# Patient Record
Sex: FEMALE | Race: ASIAN | NOT HISPANIC OR LATINO | Employment: OTHER | URBAN - METROPOLITAN AREA
[De-identification: names, ages, dates, MRNs, and addresses within clinical notes are randomized per-mention and may not be internally consistent; named-entity substitution may affect disease eponyms.]

---

## 2018-04-12 ENCOUNTER — APPOINTMENT (OUTPATIENT)
Dept: GENERAL RADIOLOGY | Facility: CLINIC | Age: 58
End: 2018-04-12
Attending: EMERGENCY MEDICINE
Payer: COMMERCIAL

## 2018-04-12 ENCOUNTER — HOSPITAL ENCOUNTER (OUTPATIENT)
Facility: CLINIC | Age: 58
Setting detail: OBSERVATION
Discharge: HOME | End: 2018-04-13
Attending: EMERGENCY MEDICINE | Admitting: EMERGENCY MEDICINE
Payer: COMMERCIAL

## 2018-04-12 DIAGNOSIS — R07.9 CHEST PAIN, UNSPECIFIED TYPE: ICD-10-CM

## 2018-04-12 DIAGNOSIS — R07.89 CHEST WALL PAIN: ICD-10-CM

## 2018-04-12 LAB
ALBUMIN SERPL-MCNC: 3.5 G/DL (ref 3.4–5)
ALP SERPL-CCNC: 69 U/L (ref 40–150)
ALT SERPL W P-5'-P-CCNC: 16 U/L (ref 0–50)
ANION GAP SERPL CALCULATED.3IONS-SCNC: 7 MMOL/L (ref 3–14)
AST SERPL W P-5'-P-CCNC: 14 U/L (ref 0–45)
BASOPHILS # BLD AUTO: 0 10E9/L (ref 0–0.2)
BASOPHILS NFR BLD AUTO: 0.2 %
BILIRUB SERPL-MCNC: 0.2 MG/DL (ref 0.2–1.3)
BUN SERPL-MCNC: 12 MG/DL (ref 7–30)
CALCIUM SERPL-MCNC: 9.1 MG/DL (ref 8.5–10.1)
CHLORIDE SERPL-SCNC: 108 MMOL/L (ref 94–109)
CO2 SERPL-SCNC: 27 MMOL/L (ref 20–32)
CREAT SERPL-MCNC: 0.7 MG/DL (ref 0.52–1.04)
D DIMER PPP FEU-MCNC: 0.3 UG/ML FEU (ref 0–0.5)
DIFFERENTIAL METHOD BLD: NORMAL
EOSINOPHIL # BLD AUTO: 0.1 10E9/L (ref 0–0.7)
EOSINOPHIL NFR BLD AUTO: 2.4 %
ERYTHROCYTE [DISTWIDTH] IN BLOOD BY AUTOMATED COUNT: 13.5 % (ref 10–15)
GFR SERPL CREATININE-BSD FRML MDRD: 86 ML/MIN/1.7M2
GLUCOSE SERPL-MCNC: 89 MG/DL (ref 70–99)
HCT VFR BLD AUTO: 36.3 % (ref 35–47)
HGB BLD-MCNC: 11.7 G/DL (ref 11.7–15.7)
IMM GRANULOCYTES # BLD: 0 10E9/L (ref 0–0.4)
IMM GRANULOCYTES NFR BLD: 0.4 %
LYMPHOCYTES # BLD AUTO: 2.4 10E9/L (ref 0.8–5.3)
LYMPHOCYTES NFR BLD AUTO: 44.8 %
MCH RBC QN AUTO: 26.7 PG (ref 26.5–33)
MCHC RBC AUTO-ENTMCNC: 32.2 G/DL (ref 31.5–36.5)
MCV RBC AUTO: 83 FL (ref 78–100)
MONOCYTES # BLD AUTO: 0.3 10E9/L (ref 0–1.3)
MONOCYTES NFR BLD AUTO: 5.1 %
NEUTROPHILS # BLD AUTO: 2.5 10E9/L (ref 1.6–8.3)
NEUTROPHILS NFR BLD AUTO: 47.1 %
NRBC # BLD AUTO: 0 10*3/UL
NRBC BLD AUTO-RTO: 0 /100
NT-PROBNP SERPL-MCNC: 220 PG/ML (ref 0–900)
PLATELET # BLD AUTO: 238 10E9/L (ref 150–450)
POTASSIUM SERPL-SCNC: 4.3 MMOL/L (ref 3.4–5.3)
PROT SERPL-MCNC: 7.5 G/DL (ref 6.8–8.8)
RBC # BLD AUTO: 4.38 10E12/L (ref 3.8–5.2)
SODIUM SERPL-SCNC: 142 MMOL/L (ref 133–144)
TROPONIN I SERPL-MCNC: 0.03 UG/L (ref 0–0.04)
TROPONIN I SERPL-MCNC: 0.03 UG/L (ref 0–0.04)
WBC # BLD AUTO: 5.3 10E9/L (ref 4–11)

## 2018-04-12 PROCEDURE — 84484 ASSAY OF TROPONIN QUANT: CPT | Mod: 91 | Performed by: NURSE PRACTITIONER

## 2018-04-12 PROCEDURE — 99285 EMERGENCY DEPT VISIT HI MDM: CPT | Mod: 25 | Performed by: EMERGENCY MEDICINE

## 2018-04-12 PROCEDURE — 84484 ASSAY OF TROPONIN QUANT: CPT | Performed by: EMERGENCY MEDICINE

## 2018-04-12 PROCEDURE — G0378 HOSPITAL OBSERVATION PER HR: HCPCS

## 2018-04-12 PROCEDURE — 85025 COMPLETE CBC W/AUTO DIFF WBC: CPT | Performed by: EMERGENCY MEDICINE

## 2018-04-12 PROCEDURE — 93005 ELECTROCARDIOGRAM TRACING: CPT | Performed by: EMERGENCY MEDICINE

## 2018-04-12 PROCEDURE — 80053 COMPREHEN METABOLIC PANEL: CPT | Performed by: EMERGENCY MEDICINE

## 2018-04-12 PROCEDURE — 84484 ASSAY OF TROPONIN QUANT: CPT | Performed by: NURSE PRACTITIONER

## 2018-04-12 PROCEDURE — 93010 ELECTROCARDIOGRAM REPORT: CPT | Mod: Z6 | Performed by: EMERGENCY MEDICINE

## 2018-04-12 PROCEDURE — 83880 ASSAY OF NATRIURETIC PEPTIDE: CPT | Performed by: EMERGENCY MEDICINE

## 2018-04-12 PROCEDURE — 25000132 ZZH RX MED GY IP 250 OP 250 PS 637: Performed by: EMERGENCY MEDICINE

## 2018-04-12 PROCEDURE — 25000132 ZZH RX MED GY IP 250 OP 250 PS 637: Performed by: NURSE PRACTITIONER

## 2018-04-12 PROCEDURE — 99219 ZZC INITIAL OBSERVATION CARE,LEVL II: CPT | Mod: Z6 | Performed by: NURSE PRACTITIONER

## 2018-04-12 PROCEDURE — 85379 FIBRIN DEGRADATION QUANT: CPT | Performed by: EMERGENCY MEDICINE

## 2018-04-12 PROCEDURE — 71045 X-RAY EXAM CHEST 1 VIEW: CPT

## 2018-04-12 PROCEDURE — 84443 ASSAY THYROID STIM HORMONE: CPT | Performed by: NURSE PRACTITIONER

## 2018-04-12 PROCEDURE — 36415 COLL VENOUS BLD VENIPUNCTURE: CPT | Performed by: NURSE PRACTITIONER

## 2018-04-12 RX ORDER — HYDRALAZINE HYDROCHLORIDE 20 MG/ML
10 INJECTION INTRAMUSCULAR; INTRAVENOUS EVERY 6 HOURS PRN
Status: DISCONTINUED | OUTPATIENT
Start: 2018-04-12 | End: 2018-04-13 | Stop reason: HOSPADM

## 2018-04-12 RX ORDER — NITROGLYCERIN 0.4 MG/1
0.4 TABLET SUBLINGUAL EVERY 5 MIN PRN
Status: DISCONTINUED | OUTPATIENT
Start: 2018-04-12 | End: 2018-04-13 | Stop reason: HOSPADM

## 2018-04-12 RX ORDER — ACETAMINOPHEN 500 MG
1000 TABLET ORAL ONCE
Status: COMPLETED | OUTPATIENT
Start: 2018-04-12 | End: 2018-04-12

## 2018-04-12 RX ORDER — RIFAMPIN 300 MG/1
600 CAPSULE ORAL DAILY
Status: DISCONTINUED | OUTPATIENT
Start: 2018-04-13 | End: 2018-04-13 | Stop reason: HOSPADM

## 2018-04-12 RX ORDER — HYDROCHLOROTHIAZIDE 12.5 MG/1
25 CAPSULE ORAL DAILY
Status: DISCONTINUED | OUTPATIENT
Start: 2018-04-13 | End: 2018-04-13 | Stop reason: HOSPADM

## 2018-04-12 RX ORDER — LIDOCAINE 40 MG/G
CREAM TOPICAL
Status: DISCONTINUED | OUTPATIENT
Start: 2018-04-12 | End: 2018-04-13 | Stop reason: HOSPADM

## 2018-04-12 RX ORDER — CELECOXIB 100 MG/1
200 CAPSULE ORAL DAILY
Status: DISCONTINUED | OUTPATIENT
Start: 2018-04-12 | End: 2018-04-13 | Stop reason: HOSPADM

## 2018-04-12 RX ORDER — ALPRAZOLAM 0.5 MG
0.5 TABLET ORAL AT BEDTIME
Status: DISCONTINUED | OUTPATIENT
Start: 2018-04-12 | End: 2018-04-13 | Stop reason: HOSPADM

## 2018-04-12 RX ORDER — CARVEDILOL 12.5 MG/1
25 TABLET ORAL ONCE
Status: COMPLETED | OUTPATIENT
Start: 2018-04-12 | End: 2018-04-12

## 2018-04-12 RX ORDER — SENNOSIDES 8.6 MG
650 CAPSULE ORAL EVERY 8 HOURS PRN
COMMUNITY

## 2018-04-12 RX ORDER — CARVEDILOL 25 MG/1
25 TABLET ORAL 2 TIMES DAILY WITH MEALS
COMMUNITY
End: 2020-08-06

## 2018-04-12 RX ORDER — METOPROLOL TARTRATE 50 MG
50-100 TABLET ORAL
Status: COMPLETED | OUTPATIENT
Start: 2018-04-12 | End: 2018-04-13

## 2018-04-12 RX ORDER — ACETAMINOPHEN 325 MG/1
650 TABLET ORAL EVERY 4 HOURS PRN
Status: DISCONTINUED | OUTPATIENT
Start: 2018-04-12 | End: 2018-04-13 | Stop reason: HOSPADM

## 2018-04-12 RX ORDER — HYDROCODONE BITARTRATE AND ACETAMINOPHEN 5; 325 MG/1; MG/1
1-2 TABLET ORAL EVERY 6 HOURS PRN
Status: DISCONTINUED | OUTPATIENT
Start: 2018-04-12 | End: 2018-04-13 | Stop reason: HOSPADM

## 2018-04-12 RX ORDER — ACETAMINOPHEN 650 MG/1
650 SUPPOSITORY RECTAL EVERY 4 HOURS PRN
Status: DISCONTINUED | OUTPATIENT
Start: 2018-04-12 | End: 2018-04-13 | Stop reason: HOSPADM

## 2018-04-12 RX ORDER — ASPIRIN 325 MG
325 TABLET ORAL ONCE
Status: COMPLETED | OUTPATIENT
Start: 2018-04-12 | End: 2018-04-12

## 2018-04-12 RX ORDER — ATORVASTATIN CALCIUM 20 MG/1
20 TABLET, FILM COATED ORAL DAILY
Status: DISCONTINUED | OUTPATIENT
Start: 2018-04-13 | End: 2018-04-13 | Stop reason: HOSPADM

## 2018-04-12 RX ORDER — ALUMINA, MAGNESIA, AND SIMETHICONE 2400; 2400; 240 MG/30ML; MG/30ML; MG/30ML
30 SUSPENSION ORAL EVERY 4 HOURS PRN
Status: DISCONTINUED | OUTPATIENT
Start: 2018-04-12 | End: 2018-04-13 | Stop reason: HOSPADM

## 2018-04-12 RX ORDER — ASPIRIN 81 MG/1
81 TABLET ORAL DAILY
Status: DISCONTINUED | OUTPATIENT
Start: 2018-04-13 | End: 2018-04-13 | Stop reason: HOSPADM

## 2018-04-12 RX ORDER — NALOXONE HYDROCHLORIDE 0.4 MG/ML
.1-.4 INJECTION, SOLUTION INTRAMUSCULAR; INTRAVENOUS; SUBCUTANEOUS
Status: DISCONTINUED | OUTPATIENT
Start: 2018-04-12 | End: 2018-04-13 | Stop reason: HOSPADM

## 2018-04-12 RX ADMIN — ACETAMINOPHEN 650 MG: 325 TABLET, FILM COATED ORAL at 23:45

## 2018-04-12 RX ADMIN — ALPRAZOLAM 0.5 MG: 0.5 TABLET ORAL at 23:45

## 2018-04-12 RX ADMIN — ASPIRIN 325 MG ORAL TABLET 325 MG: 325 PILL ORAL at 17:14

## 2018-04-12 RX ADMIN — CARVEDILOL 25 MG: 12.5 TABLET, FILM COATED ORAL at 23:46

## 2018-04-12 RX ADMIN — CELECOXIB 200 MG: 100 CAPSULE ORAL at 23:47

## 2018-04-12 RX ADMIN — ACETAMINOPHEN 1000 MG: 500 TABLET ORAL at 17:14

## 2018-04-12 ASSESSMENT — ENCOUNTER SYMPTOMS
HEADACHES: 1
NAUSEA: 0
BACK PAIN: 1
FEVER: 0
UNEXPECTED WEIGHT CHANGE: 0
DIAPHORESIS: 0
SHORTNESS OF BREATH: 1

## 2018-04-12 NOTE — IP AVS SNAPSHOT
Unit [address] Observation [address] [address]    Phone:  [phone]    Fax:  [phone]                                       After Visit Summary   4/12/2018    Hallie Huitron    MRN: [id number]           After Visit Summary Signature Page     I have received my discharge instructions, and my questions have been answered. I have discussed any challenges I see with this plan with the nurse or doctor.    ..........................................................................................................................................  Patient/Patient Representative Signature      ..........................................................................................................................................  Patient Representative Print Name and Relationship to Patient    ..................................................               ................................................  Date                                            Time    ..........................................................................................................................................  Reviewed by Signature/Title    ...................................................              ..............................................  Date                                                            Time

## 2018-04-12 NOTE — ED NOTES
[facility], [place]   ED Nurse to Floor Handoff     Hallie Huitron is a 57 year old female who speaks English and lives with family members,  in a home  They arrived in the ED by car from home    ED Chief Complaint: Chest Pain (intermittent mid strenal chest pain and shortness of breath, increasing over the last 2 days)    ED Dx;   Final diagnoses:   Chest pain, unspecified type         Needed?: No    Allergies: No Known Allergies.  Past Medical Hx:   Past Medical History:   Diagnosis Date     Cardiac angina (H)      Hypertension       Baseline Mental status: WDL  Current Mental Status changes: at basesline    Infection present or suspected this encounter: no  Sepsis suspected: No  Isolation type: No active isolations     Activity level - Baseline/Home:  Independent  Activity Level - Current:   Independent    Bariatric equipment needed?: No    In the ED these meds were given:   Medications   acetaminophen (TYLENOL) tablet 1,000 mg (1,000 mg Oral Given 4/12/18 1714)   aspirin tablet 325 mg (325 mg Oral Given 4/12/18 1714)       Drips running?  No    Home pump  No    Current LDAs  Peripheral IV 04/12/18 Left Lower forearm (Active)   Number of days:0       Right Radial Interventional Cardiac Procedure Access (Active)   Number of days:906       Labs results:   Labs Ordered and Resulted from Time of ED Arrival Up to the Time of Departure from the ED   CBC WITH PLATELETS DIFFERENTIAL   TROPONIN I   COMPREHENSIVE METABOLIC PANEL   D DIMER QUANTITATIVE   NT PROBNP INPATIENT   PERIPHERAL IV CATHETER       Imaging Studies:   Recent Results (from the past 24 hour(s))   XR Chest Port 1 View    Narrative    XR CHEST PORT 1 VW 4/12/2018 2:56 PM     HISTORY: Chest pain    COMPARISON: 10/18/2015      Impression    IMPRESSION: The heart size is normal. The lungs are clear. No evidence  of pneumothorax.    GEN NARANJO MD       Recent vital signs:   /89  Pulse 73  Temp 98.1  F (36.7  C)  (Oral)  Resp 18  Wt 91.9 kg (202 lb 8 oz)  SpO2 100%  BMI 32.68 kg/m2    Cardiac Rhythm: Normal Sinus  Pt needs tele? Yes  Skin/wound Issues: None    Code Status: Full Code    Pain control: fair    Nausea control: pt had none    Abnormal labs/tests/findings requiring intervention: none      Family present during ED course? Yes   Family Comments/Social Situation comments:       Tasks needing completion: None    Shannon Carrizales RN  [facility]-- [phone] [facility]  [phone] [facility]

## 2018-04-12 NOTE — IP AVS SNAPSHOT
MRN:[id number]                      After Visit Summary   4/12/2018    Hallie Huitron    MRN: [id number]           Thank you!     Thank you for choosing [facility] for your care. Our goal is always to provide you with excellent care. Hearing back from our patients is one way we can continue to improve our services. Please take a few minutes to complete the written survey that you may receive in the mail after you visit with us. Thank you!        Patient Information     Date Of Birth          1960        Designated Caregiver       Most Recent Value    Caregiver    Will someone help with your care after discharge? no      About your hospital stay     You were admitted on:  April 12, 2018 You last received care in the:  Unit 6D Observation [facility]    You were discharged on:  April 13, 2018        Reason for your hospital stay       Chest pain                  Who to Call     For medical emergencies, please call 911.  For non-urgent questions about your medical care, please call your primary care provider or clinic, [phone]          Attending Provider     Provider Specialty    Davina Fontaine MD Emergency Medicine    [facility], Julianne Johnson MD Emergency Medicine       Primary Care Provider Office Phone # Fax #    Nomi Ramos [phone]       When to contact your care team       Please go to your nearest emergency room If you were to have a change in the type of chest pain i.e more severe, lasting longer or radiating to your shoulder, arm, neck, jaw or back, shortness of breath or increased pain with breathing, coughing up blood, feel dizzy or lightheaded, or notice swelling in one leg.                  After Care Instructions     Activity       Your activity upon discharge: activity as tolerated            Diet       Follow this diet upon discharge: Regular            Discharge Instructions       You were admitted to the observation unit for evaluation of your chest  "pain.  Your EKG was normal. Troponins (a lab test to check if there was damage to the heart tissue) were checked and these were negative. Chest x-ray was normal.  You underwent a stress test and this was normal. The chest pain you came into the emergency room for does not appear to be cardiac chest pain. I recommend continuing your home medications and it will be very important to follow up with your primary care doctor early next week or sooner if your symptoms return.                  Follow-up Appointments     Follow Up and recommended labs and tests       Follow up with primary in 1 week                  Pending Results     No orders found for last 3 day(s).            Statement of Approval     Ordered          04/13/18 1529  I have reviewed and agree with all the recommendations and orders detailed in this document.  EFFECTIVE NOW     Approved and electronically signed by:  Kacie Thomason PA-C             Admission Information     Date & Time Provider Department Dept. Phone    4/12/2018 Julianne Richard MD Unit 6D Observation [facility] [place] [phone]      Your Vitals Were     Blood Pressure Pulse Temperature Respirations Weight Pulse Oximetry    132/74 (BP Location: Left arm) 81 98.1  F (36.7  C) (Oral) 18 91.9 kg (202 lb 8 oz) 100%    BMI (Body Mass Index)                   32.68 kg/m2           [employer] Information     [employer] lets you send messages to your doctor, view your test results, renew your prescriptions, schedule appointments and more. To sign up, go to [email or web address] . Click on \"Log in\" on the left side of the screen, which will take you to the Welcome page. Then click on \"Sign up Now\" on the right side of the page.     You will be asked to enter the access code listed below, as well as some personal information. Please follow the directions to create your username and password.     Your access code is: [email or web address]  Expires: 7/12/2018  3:48 PM     Your access code will "  in 90 days. If you need help or a new code, please call your [facility] clinic or [phone].        Care EveryWhere ID     This is your Care EveryWhere ID. This could be used by other organizations to access your [facility] medical records  [id number]        Equal Access to Services     KAITLINDANIEL ROSALBA : Hadii aad ku hadglenisakil Soyakelinali, waaxda luqadaha, qaybta kaalmada adeegyada, amaris marksumairarmaan espino. So [facility] [phone].    ATENCIÓN: Si habla español, tiene a leigh disposición servicios gratuitos de asistencia lingüística. Kostas al [phone].    We comply with applicable federal civil rights laws and [place] laws. We do not discriminate on the basis of race, color, national origin, age, disability, sex, sexual orientation, or gender identity.               Review of your medicines      CONTINUE these medicines which have NOT CHANGED        Dose / Directions    acetaminophen 650 MG CR tablet   Commonly known as:  TYLENOL        Dose:  650 mg   Take 650 mg by mouth every 8 hours as needed for mild pain or fever   Refills:  0       ATORVASTATIN CALCIUM PO        Dose:  20 mg   Take 20 mg by mouth daily   Refills:  0       carvedilol 25 MG tablet   Commonly known as:  COREG        Dose:  25 mg   Take 25 mg by mouth 2 times daily (with meals)   Refills:  0       CELEBREX PO        Dose:  200 mg   Take 200 mg by mouth daily   Refills:  0       cod liver oil Caps capsule        Dose:  1 capsule   Take 1 capsule by mouth daily   Refills:  0       CYCLOBENZAPRINE HCL PO        Dose:  10 mg   Take 10 mg by mouth 2 times daily as needed for muscle spasms   Refills:  0       HYDROCHLOROTHIAZIDE PO        Dose:  25 mg   Take 25 mg by mouth daily   Refills:  0       RIFAMPIN PO        Dose:  600 mg   Take 600 mg by mouth daily   Refills:  0                Protect others around you: Learn how to safely use, store and throw away your medicines at www.disposemymeds.org.        ANTIBIOTIC INSTRUCTION      You've Been Prescribed an Antibiotic - Now What?  Your healthcare team thinks that you or your loved one might have an infection. Some infections can be treated with antibiotics, which are powerful, life-saving drugs. Like all medications, antibiotics have side effects and should only be used when necessary. There are some important things you should know about your antibiotic treatment.      Your healthcare team may run tests before you start taking an antibiotic.    Your team may take samples (e.g., from your blood, urine or other areas) to run tests to look for bacteria. These test can be important to determine if you need an antibiotic at all and, if you do, which antibiotic will work best.      Within a few days, your healthcare team might change or even stop your antibiotic.    Your team may start you on an antibiotic while they are working to find out what is making you sick.    Your team might change your antibiotic because test results show that a different antibiotic would be better to treat your infection.    In some cases, once your team has more information, they learn that you do not need an antibiotic at all. They may find out that you don't have an infection, or that the antibiotic you're taking won't work against your infection. For example, an infection caused by a virus can't be treated with antibiotics. Staying on an antibiotic when you don't need it is more likely to be harmful than helpful.      You may experience side effects from your antibiotic.    Like all medications, antibiotics have side effects. Some of these can be serious.    Let you healthcare team know if you have any known allergies when you are admitted to the hospital.    One significant side effect of nearly all antibiotics is the risk of severe and sometimes deadly diarrhea caused by Clostridium difficile (C. Difficile). This occurs when a person takes antibiotics because some good germs are destroyed. Antibiotic use allows C.  diificile to take over, putting patients at high risk for this serious infection.    As a patient or caregiver, it is important to understand your or your loved one's antibiotic treatment. It is especially important for caregivers to speak up when patients can't speak for themselves. Here are some important questions to ask your healthcare team.    What infection is this antibiotic treating and how do you know I have that infection?    What side effects might occur from this antibiotic?    How long will I need to take this antibiotic?    Is it safe to take this antibiotic with other medications or supplements (e.g., vitamins) that I am taking?     Are there any special directions I need to know about taking this antibiotic? For example, should I take it with food?    How will I be monitored to know whether my infection is responding to the antibiotic?    What tests may help to make sure the right antibiotic is prescribed for me?      Information provided by:  www.cdc.gov/getsmart  U.S. Department of Health and Human Services  Centers for disease Control and Prevention  National Center for Emerging and Zoonotic Infectious Diseases  Division of Healthcare Quality Promotion             Medication List: This is a list of all your medications and when to take them. Check marks below indicate your daily home schedule. Keep this list as a reference.      Medications           Morning Afternoon Evening Bedtime As Needed    acetaminophen 650 MG CR tablet   Commonly known as:  TYLENOL   Take 650 mg by mouth every 8 hours as needed for mild pain or fever                                ATORVASTATIN CALCIUM PO   Take 20 mg by mouth daily   Last time this was given:  20 mg on 4/13/2018  8:01 AM                                carvedilol 25 MG tablet   Commonly known as:  COREG   Take 25 mg by mouth 2 times daily (with meals)   Last time this was given:  25 mg on 4/12/2018 11:46 PM                                CELEBREX PO   Take  200 mg by mouth daily   Last time this was given:  200 mg on 4/12/2018 11:47 PM                                cod liver oil Caps capsule   Take 1 capsule by mouth daily                                CYCLOBENZAPRINE HCL PO   Take 10 mg by mouth 2 times daily as needed for muscle spasms                                HYDROCHLOROTHIAZIDE PO   Take 25 mg by mouth daily   Last time this was given:  25 mg on 4/13/2018  8:01 AM                                RIFAMPIN PO   Take 600 mg by mouth daily

## 2018-04-12 NOTE — PHARMACY-ADMISSION MEDICATION HISTORY
Admission Medication History status for the 4/12/2018 admission is complete.  See EPIC admission navigator for Prior to Admission medications.    Medication history sources:  Patient and her daughter, hospital ([facility]) supplied medication list     Medication history source reliability: Good    Medication adherence:  Good    Changes made to PTA medication list (reason)  Added: acetaminophen  Deleted: nitroglycerin 0.4 mg SL tablet- place 1 tablet under the tongue every 5 minutes as needed for chest pain  Changed: Carvedilol 12.5 mg tablet changed to 25 mg tablets    Additional medication history information (including reliability of information, actions taken by pharmacist):   -Patient states that carvedilol dose was increased per her physician.   -Patient states that cyclobenzaprine is a new prescription. She just got it and only used it last night (4/11/18).   -Patient states that she has never had a prescription for or used nitroglycerin.   -Patient states that she uses acetaminophen for occasional pain relief, and that she only uses one tablet at a time.     Time spent in this activity: 20 minutes    Medication history completed by: HUSSEIN Tuttle2 Pharmacy Intern    Prior to Admission medications    Medication Sig Last Dose Taking? Auth Provider   CYCLOBENZAPRINE HCL PO Take 10 mg by mouth 2 times daily as needed for muscle spasms 4/11/2018 at PM Yes Reported, Patient   ATORVASTATIN CALCIUM PO Take 20 mg by mouth daily 4/12/2018 at Unknown time Yes Reported, Patient   Celecoxib (CELEBREX PO) Take 200 mg by mouth daily 4/11/2018 at Unknown time Yes Reported, Patient   cod liver oil CAPS capsule Take 1 capsule by mouth daily  4/11/2018 at Unknown time Yes Reported, Patient   HYDROCHLOROTHIAZIDE PO Take 25 mg by mouth daily 4/12/2018 at AM Yes Reported, Patient   RIFAMPIN PO Take 600 mg by mouth daily 4/11/2018 at Unknown time Yes Reported, Patient   carvedilol (COREG) 25 MG tablet Take 25 mg by mouth 2 times  daily (with meals) 4/12/2018 at AM Yes Unknown, Entered By History   acetaminophen (TYLENOL) 650 MG CR tablet Take 650 mg by mouth every 8 hours as needed for mild pain or fever Past Month at Unknown time Yes Unknown, Entered By History

## 2018-04-12 NOTE — ED PROVIDER NOTES
History     Chief Complaint   Patient presents with     Chest Pain     intermittent mid strenal chest pain and shortness of breath, increasing over the last 2 days     The history is provided by the patient and a relative.     Hallie Huitron is a 57 year old female with a history of hypertension who presents to the Emergency Department for left chest pain. The patient is with her daughter. It is noted that the left chest pain started three days ago and she was in the ER at [facility] two days ago; She was diagnosed with neck strain. She was sent home with a muscle relaxant (Cyclobenzaprine). During that visit her troponin,angio and stress test were normal. The patient is complaining of arm pain, left chest pain, and back pain. The daughter also notes that the patient has been having facial and bilateral leg swelling. Daughter notes that her mother took the muscle relaxant and it did not relieve her symptoms. The next day, today, her symptoms were worse and accompanied with a headache. Her eyes were swollen this morning that she couldn't open them. Patient says she is feeling palpations.    The patient describes her left chest pain as a pressure type pain that goes to her shoulder and is intermittent, it comes every 15-20 minutes and lasts for a few seconds. The pain is sharp in her back.. She also reports trouble breathing that is constant. Patient denies nausea, fevers, weighloss or sweating. The patient notes that she does not take any water pills. She takes medication for her hypertension and high cholesterol. She was also diagnosed with latent TB a couple months ago and has been taking Rifampin; This is part of an immigration process, hasn't been back to [place] since November.    She was seen 10/17/2015 for similar symptoms and she had a chest CT, and chest xray, results below.    Chest CT  IMPRESSION: No evidence of dissection or other acute pulmonary  Abnormality.    Chest xray  IMPRESSION: No acute  cardiopulmonary abnormality.    I have reviewed the Medications, Allergies, Past Medical and Surgical History, and Social History in the [employer] system.  Past Medical History:   Diagnosis Date     Cardiac angina (H)      Hypertension        Past Surgical History:   Procedure Laterality Date     CHOLECYSTECTOMY       HYSTERECTOMY         No family history on file.    Social History   Substance Use Topics     Smoking status: Never Smoker     Smokeless tobacco: Never Used     Alcohol use No       No current facility-administered medications for this encounter.      Current Outpatient Prescriptions   Medication     CYCLOBENZAPRINE HCL PO     ATORVASTATIN CALCIUM PO     Celecoxib (CELEBREX PO)     cod liver oil CAPS capsule     HYDROCHLOROTHIAZIDE PO     RIFAMPIN PO     carvedilol (COREG) 12.5 MG tablet     nitroglycerin (NITROSTAT) 0.4 MG SL tablet      No Known Allergies    Review of Systems   Constitutional: Negative for diaphoresis, fever and unexpected weight change.   Respiratory: Positive for shortness of breath.    Cardiovascular: Positive for chest pain (L; pressure pain) and leg swelling (Bilateral LE and facial swelling).   Gastrointestinal: Negative for nausea.   Musculoskeletal: Positive for back pain.        Arm pain     Neurological: Positive for headaches.       Physical Exam   BP: (!) 199/102  Pulse: 74  Temp: 98.1  F (36.7  C)  Resp: 18  Weight: 91.9 kg (202 lb 8 oz)  SpO2: 100 %      Physical Exam   Constitutional: No distress.   HENT:   Head: Atraumatic.   Mouth/Throat: Oropharynx is clear and moist. No oropharyngeal exudate.   Eyes: Pupils are equal, round, and reactive to light. No scleral icterus.   Cardiovascular: Normal heart sounds and intact distal pulses.    Pulmonary/Chest: Breath sounds normal. No respiratory distress.   Abdominal: Soft. Bowel sounds are normal. There is no tenderness.   Musculoskeletal: She exhibits no edema or tenderness.   Skin: Skin is warm. No rash noted. She is not  diaphoretic.   Nursing note and vitals reviewed.      ED Course     ED Course     Procedures             EKG Interpretation:      Interpreted by Davina Fontaine  Time reviewed: per Epic  Symptoms at time of EKG: chest pain   Rhythm: normal sinus   Rate: normal  Axis: normal  Ectopy: none  Conduction: normal  ST Segments/ T Waves: No ST-T wave changes  Q Waves: none  Comparison to prior: No old EKG available    Clinical Impression: normal EKG          Critical Care time:  none             Labs Ordered and Resulted from Time of ED Arrival Up to the Time of Departure from the ED - No data to display         Assessments & Plan (with Medical Decision Making)     57 year old female with a history of hypertension who presents to the Emergency Department for the second time in 3 days for complaints of left chest pain radiating to the L shoulder. Her symptoms are concerning for ACS, GERD, PE, costochondritis, less likely PTX. EKG was normal. IV established and labs sent, reviewed and unremarkable including neg dimer and neg TN . CXR obtained and also revealed NAD. Patient given  and case discussed with ED OBS with plan for overnight stay for ACS rule out.      I have reviewed the nursing notes.    I have reviewed the findings, diagnosis, plan and need for follow up with the patient.    New Prescriptions    No medications on file       Final diagnoses:   Chest pain, unspecified type     I, Martina Huynh, am serving as a trained medical scribe to document services personally performed by Davina Fontaine MD, based on the provider's statements to me.   IDavina MD, was physically present and have reviewed and verified the accuracy of this note documented by Martina Huynh.    4/12/2018   [facility], [place], EMERGENCY DEPARTMENT     Davina Fontaine MD  04/12/18 5194

## 2018-04-13 ENCOUNTER — APPOINTMENT (OUTPATIENT)
Dept: CT IMAGING | Facility: CLINIC | Age: 58
End: 2018-04-13
Payer: COMMERCIAL

## 2018-04-13 VITALS
DIASTOLIC BLOOD PRESSURE: 74 MMHG | SYSTOLIC BLOOD PRESSURE: 132 MMHG | RESPIRATION RATE: 18 BRPM | OXYGEN SATURATION: 100 % | HEART RATE: 81 BPM | WEIGHT: 202.5 LBS | TEMPERATURE: 98.1 F | BODY MASS INDEX: 32.68 KG/M2

## 2018-04-13 LAB
ALBUMIN SERPL-MCNC: 2.9 G/DL (ref 3.4–5)
ALP SERPL-CCNC: 63 U/L (ref 40–150)
ALT SERPL W P-5'-P-CCNC: 14 U/L (ref 0–50)
ANION GAP SERPL CALCULATED.3IONS-SCNC: 5 MMOL/L (ref 3–14)
AST SERPL W P-5'-P-CCNC: 12 U/L (ref 0–45)
BILIRUB SERPL-MCNC: 0.2 MG/DL (ref 0.2–1.3)
BUN SERPL-MCNC: 17 MG/DL (ref 7–30)
CALCIUM SERPL-MCNC: 8.5 MG/DL (ref 8.5–10.1)
CHLORIDE SERPL-SCNC: 111 MMOL/L (ref 94–109)
CO2 SERPL-SCNC: 25 MMOL/L (ref 20–32)
CREAT SERPL-MCNC: 0.74 MG/DL (ref 0.52–1.04)
ERYTHROCYTE [DISTWIDTH] IN BLOOD BY AUTOMATED COUNT: 14 % (ref 10–15)
GFR SERPL CREATININE-BSD FRML MDRD: 81 ML/MIN/1.7M2
GLUCOSE SERPL-MCNC: 97 MG/DL (ref 70–99)
HCT VFR BLD AUTO: 34.7 % (ref 35–47)
HGB BLD-MCNC: 11 G/DL (ref 11.7–15.7)
INTERPRETATION ECG - MUSE: NORMAL
MCH RBC QN AUTO: 26.6 PG (ref 26.5–33)
MCHC RBC AUTO-ENTMCNC: 31.7 G/DL (ref 31.5–36.5)
MCV RBC AUTO: 84 FL (ref 78–100)
PLATELET # BLD AUTO: 223 10E9/L (ref 150–450)
POTASSIUM SERPL-SCNC: 4 MMOL/L (ref 3.4–5.3)
PROT SERPL-MCNC: 6.5 G/DL (ref 6.8–8.8)
RBC # BLD AUTO: 4.13 10E12/L (ref 3.8–5.2)
SODIUM SERPL-SCNC: 141 MMOL/L (ref 133–144)
TROPONIN I SERPL-MCNC: <0.015 UG/L (ref 0–0.04)
TSH SERPL DL<=0.005 MIU/L-ACNC: 2.6 MU/L (ref 0.4–4)
WBC # BLD AUTO: 4.8 10E9/L (ref 4–11)

## 2018-04-13 PROCEDURE — 40000556 ZZH STATISTIC PERIPHERAL IV START W US GUIDANCE

## 2018-04-13 PROCEDURE — G0378 HOSPITAL OBSERVATION PER HR: HCPCS

## 2018-04-13 PROCEDURE — 25000128 H RX IP 250 OP 636: Performed by: INTERNAL MEDICINE

## 2018-04-13 PROCEDURE — 75574 CT ANGIO HRT W/3D IMAGE: CPT

## 2018-04-13 PROCEDURE — 25000132 ZZH RX MED GY IP 250 OP 250 PS 637: Performed by: INTERNAL MEDICINE

## 2018-04-13 PROCEDURE — 75574 CT ANGIO HRT W/3D IMAGE: CPT | Mod: 26 | Performed by: INTERNAL MEDICINE

## 2018-04-13 PROCEDURE — 25000132 ZZH RX MED GY IP 250 OP 250 PS 637: Performed by: NURSE PRACTITIONER

## 2018-04-13 PROCEDURE — 99217 ZZC OBSERVATION CARE DISCHARGE: CPT | Mod: Z6 | Performed by: PHYSICIAN ASSISTANT

## 2018-04-13 PROCEDURE — 85027 COMPLETE CBC AUTOMATED: CPT | Performed by: NURSE PRACTITIONER

## 2018-04-13 PROCEDURE — 80053 COMPREHEN METABOLIC PANEL: CPT | Performed by: NURSE PRACTITIONER

## 2018-04-13 PROCEDURE — 36415 COLL VENOUS BLD VENIPUNCTURE: CPT | Performed by: NURSE PRACTITIONER

## 2018-04-13 RX ORDER — NITROGLYCERIN 0.4 MG/1
0.4 TABLET SUBLINGUAL
Status: DISCONTINUED | OUTPATIENT
Start: 2018-04-13 | End: 2018-04-13 | Stop reason: HOSPADM

## 2018-04-13 RX ORDER — METOPROLOL TARTRATE 1 MG/ML
5-15 INJECTION, SOLUTION INTRAVENOUS
Status: DISCONTINUED | OUTPATIENT
Start: 2018-04-13 | End: 2018-04-13 | Stop reason: HOSPADM

## 2018-04-13 RX ORDER — IOPAMIDOL 755 MG/ML
110 INJECTION, SOLUTION INTRAVASCULAR ONCE
Status: DISCONTINUED | OUTPATIENT
Start: 2018-04-13 | End: 2018-04-13

## 2018-04-13 RX ORDER — ACYCLOVIR 200 MG/1
0-1 CAPSULE ORAL
Status: DISCONTINUED | OUTPATIENT
Start: 2018-04-13 | End: 2018-04-13 | Stop reason: HOSPADM

## 2018-04-13 RX ORDER — IOPAMIDOL 755 MG/ML
110 INJECTION, SOLUTION INTRAVASCULAR ONCE
Status: COMPLETED | OUTPATIENT
Start: 2018-04-13 | End: 2018-04-13

## 2018-04-13 RX ORDER — METOPROLOL TARTRATE 50 MG
50-100 TABLET ORAL
Status: DISCONTINUED | OUTPATIENT
Start: 2018-04-13 | End: 2018-04-13 | Stop reason: HOSPADM

## 2018-04-13 RX ADMIN — NITROGLYCERIN 0.4 MG: 0.4 TABLET SUBLINGUAL at 10:39

## 2018-04-13 RX ADMIN — HYDROCHLOROTHIAZIDE 25 MG: 12.5 CAPSULE ORAL at 08:01

## 2018-04-13 RX ADMIN — ATORVASTATIN CALCIUM 20 MG: 20 TABLET, FILM COATED ORAL at 08:01

## 2018-04-13 RX ADMIN — METOPROLOL TARTRATE 50 MG: 50 TABLET, FILM COATED ORAL at 08:59

## 2018-04-13 RX ADMIN — IOPAMIDOL 110 ML: 755 INJECTION, SOLUTION INTRAVENOUS at 10:29

## 2018-04-13 RX ADMIN — ASPIRIN 81 MG: 81 TABLET, COATED ORAL at 08:01

## 2018-04-13 NOTE — DISCHARGE SUMMARY
Discharge Summary    Hallie Huitron MRN# [id number]   YOB: 1960 Age: 57 year old     Date of Admission:  4/12/2018  Date of Discharge:  4/13/2018  Admitting Physician:  Davina Fontaine MD  Discharge Physician:  DORETHA MATIAS  Discharging Service:  Emergency Department Observation Unit      Primary Provider: Nomi Ramos          Discharge Diagnosis:     Chest pain    * No resolved hospital problems. *               Discharge Disposition:   Discharged to home           Condition on Discharge:   Discharge condition: Stable   Code status on discharge:            Procedures:   Cardiology procedures perfromed:   Cardiac angiography             Discharge Medications:     Current Discharge Medication List      CONTINUE these medications which have NOT CHANGED    Details   CYCLOBENZAPRINE HCL PO Take 10 mg by mouth 2 times daily as needed for muscle spasms      ATORVASTATIN CALCIUM PO Take 20 mg by mouth daily      Celecoxib (CELEBREX PO) Take 200 mg by mouth daily      cod liver oil CAPS capsule Take 1 capsule by mouth daily       HYDROCHLOROTHIAZIDE PO Take 25 mg by mouth daily      RIFAMPIN PO Take 600 mg by mouth daily      carvedilol (COREG) 25 MG tablet Take 25 mg by mouth 2 times daily (with meals)      acetaminophen (TYLENOL) 650 MG CR tablet Take 650 mg by mouth every 8 hours as needed for mild pain or fever                   Consultations:   No consultations were requested during this admission             Brief History of Illness:   Hallie Huitron is a 57 year old female with a history of hypertension and high cholesterol. who presented to the ED with left sided chest pain which started 3 days ago. The chest pain is  constant left upper chest pain that radiates to her shoulder, neck, and left upper back. It is worse with movement. She reports a left sided headache and palpitations. She is constantly short of breath. She has a history of angio without stents in 2015. Patient on Rifampin for latent TB  started few months ago. The patient presented with these symptoms 2 days ago at [facility]. Chest xray was completed there and showed Mild linear atelectasis or scarring at the left lung base. Lungs are otherwise clear. Heart and pulmonary vasculature are normal. D dimer 0.31. Troponin 0.03. Patient received Ibuprofen and reported pain was better. Patient was diagnosed with neck strain and chest wall pain and was discharged.           Hospital Course:   1. Chest wall pain: This is a 57 year old female patient who came into the ED for evaluation of chest pain. Noted pain radiating to neck, and back with associated symptoms of headache and palpitations. In the ED, VS were BP:199/102 Pulse: 74 Temp: 98.1 Resp: 18 SP02:100 %. Labs showed Na 142, K 4.3 cr 0.70 BUN 12, GFR >90, LFTS stable, , Troponin 0.029 and 0.026, WBC 5.3, Hgb 11.7, plt 238. She had a chest xray which was negative and normal EKG. Patient was then admitted to ED observation for ACS rule out. Over night serial troponin remained negative. Patient had a CT coronary angiogram this morning with no evidence of coronary  Atherosclerosis and a 4 mm right middle lobe pulmonary nodule which is unchanged from 10/18/2015, statistically benign. Chest pain does not appear to be cardiac chest pain at this point and rather musculoskeletal in etiology. Denies dyspnea or palpitation. Continue to have neck soreness, however pain improving. Patient was advised to follow up with primary care provider early next week to consider PT. Continue to take home medications as before. Patient discharged in stable condition.     2. Hypertension -Patient is on Coreg and hydrochlorothiazide.  -  Resume PTA Coreg  as before   - Continue HCTZ        3. Hyperlipidema - Last cholesterol check was 3/2018 at [facility]. Cholesterol 260 Trig 17 HDL 74     -Continue with PTA Lipitor      4. Latent TB - Continue PTA Rifampin     5. Anxiety/insomnia - PTA Xanax                Final Day of Progress before Discharge:       Physical Exam:  Blood pressure 132/74, pulse 81, temperature 98.1  F (36.7  C), temperature source Oral, resp. rate 18, weight 91.9 kg (202 lb 8 oz), SpO2 100 %.    EXAM:  Physical Exam   Constitutional: Pt is oriented to person, place, and time.Pt appears well-developed and well-nourished.   HENT: Unremarkable  Head: Normocephalic and atraumatic.   Eyes: Conjunctivae are normal. Pupils are equal, round, and reactive to light.   Neck: Normal range of motion. Neck supple.   Cardiovascular: Normal rate, regular rhythm, normal heart sounds and intact distal pulses.    Pulmonary/Chest: Effort normal and breath sounds normal. No respiratory distress. Pt has no wheezes. Pt has no rales  Abdominal: Soft. Bowel sounds are normal. Pt exhibits no distension and no mass. No tenderness. Pt has no rebound and no guarding.   Musculoskeletal: Normal range of motion. Pt exhibits no edema.   Neurological: Pt is alert and oriented to person, place, and time. Normal reflexes.   Skin: Skin is warm and dry. No rash noted.   Psychiatric: Pt has a normal mood and affect. Behavior is normal. Judgment and thought content normal.             Data:  All laboratory data reviewed             Significant Results:   None  Results for orders placed or performed during the hospital encounter of 04/12/18   XR Chest Port 1 View    Narrative    XR CHEST PORT 1 VW 4/12/2018 2:56 PM     HISTORY: Chest pain    COMPARISON: 10/18/2015      Impression    IMPRESSION: The heart size is normal. The lungs are clear. No evidence  of pneumothorax.    GEN NARANJO MD   CT Angiogram coronary artery    Narrative    Procedure: CTA ANGIOGRAM CORONARY ARTERY   Examination Date: 4/13/2018 11:02 AM   Indication:57 years Female  Chest pain radiating to the back and neck;     Ordering Provider: Mali HOPKINS  Overall quality of the study: Good.     PROCEDURE: ECG gated multi-slice computed tomography of the heart    with and without intravenous contrast  (Isovue 370, 110 mL) was   performed on a Siemens Dual Source Flash scanner without incident.  Beta-blockers were used to optimize heart rate (Metoprolol 50 mg  Oral). Sublingual Nitrostat 0.4 mg was given prior to scanning.  Coronary artery calcium score was performed using the Flash scanner  protocol. CTA was performed in the spiral mode at a heart rate of 68  bpm with 100 kVp. Images were reconstructed and analyzed on a  [employer] workstation. Scan protocol was optimized to minimize  radiation exposure. The total radiation exposure including calcium  score was calculated to be 374 DLP, and 5.2 mSv.        Impression    IMPRESSION:  1.  No evidence of coronary atherosclerosis by CT angiography.  2.  Total Agatston score 0 placing the patient in the lowest  percentile when compared to age and gender matched control group.  3.  Please review Radiology report for incidental noncardiac findings  that will follow separately.    FINDINGS:    CORONARY CALCIUM SCORE    Total Agatston calcium score: 0   Left main: 0  Left anterior descendin  Left circumflex: 0  Right coronary artery: 0   This places the patient in the lowest  percentile when compared to age  and gender matched control group.    CORONARY ANGIOGRAPHY    DOMINANCE: Right dominant system.   Normal coronary origins and course.    LEFT MAIN:   The left main arises normally from the left coronary cusp and is  widely patent without any detectable stenosis.     LEFT ANTERIOR DESCENDING:   The left anterior descending and its major diagonal branches are  widely patent without any detectable stenosis.    RAMUS INTERMEDIUS:  The ramus intermedius is patent without any detectable stenosis.    LEFT CIRCUMFLEX:   The left circumflex and its major branches are widely patent without  any detectable stenosis.    RIGHT CORONARY ARTERY:   The right coronary artery and its major branches are widely patent  without any detectable  stenosis.    ADDITIONAL FINDINGS:   The proximal ascending aorta is normal in size.   Normal pulmonary venous anatomy with all four pulmonary veins draining  into the left atrium.    There is no left ventricular mass or thrombus.   Normal pericardial thickness. There is no pericardial effusion.  Please review Radiology report for incidental noncardiac findings that  will follow separately.    I have personally reviewed the examination and initial interpretation  and I agree with the findings.    JADEN REID MD   Radiologist Consult For Cardiology    Narrative    Radiology Consult to Cardiology, 4/13/2018 1:39 PM    History: Chest pain radiating to the back and neck    Comparison: Chest x-ray 4/12/2018, chest CTA 10/18/2015    Technique: Cardiac CT was performed by cardiology. Interpretation of  the noncardiac portions of the study was requested.  Field of view  extending from approximately the eric to the upper abdomen.    Contrast dose: iopamidol (ISOVUE-370) solution 110 mL    Findings:  No lymphadenopathy demonstrated. Heart and thoracic vasculature are  normal in size. Incidentally noted ramus intermedius. Otherwise  unremarkable appearance of the coronary arteries. No pericardial or  pleural effusion. No focal consolidation. There is a 4 mm right middle  lobe subpleural pulmonary nodule (axial series 5, image 20). Left  basilar platelike atelectasis. Imaged portions of the central airways  are clear. Visualized portions of the esophagus are normal.     Limited evaluation of the upper abdomen is unremarkable.    No suspicious osseous abnormality.       Impression    Impression:  1. 4 mm right middle lobe pulmonary nodule which is unchanged from  10/18/2015, statistically benign.  2. Please see cardiology dictation for detailed findings related to  the heart and mediastinum.    I have personally reviewed the examination and initial interpretation  and I agree with the findings.    ELMIRA WILSON MD   CBC  with platelets differential   Result Value Ref Range    WBC 5.3 4.0 - 11.0 10e9/L    RBC Count 4.38 3.8 - 5.2 10e12/L    Hemoglobin 11.7 11.7 - 15.7 g/dL    Hematocrit 36.3 35.0 - 47.0 %    MCV 83 78 - 100 fl    MCH 26.7 26.5 - 33.0 pg    MCHC 32.2 31.5 - 36.5 g/dL    RDW 13.5 10.0 - 15.0 %    Platelet Count 238 150 - 450 10e9/L    Diff Method Automated Method     % Neutrophils 47.1 %    % Lymphocytes 44.8 %    % Monocytes 5.1 %    % Eosinophils 2.4 %    % Basophils 0.2 %    % Immature Granulocytes 0.4 %    Nucleated RBCs 0 0 /100    Absolute Neutrophil 2.5 1.6 - 8.3 10e9/L    Absolute Lymphocytes 2.4 0.8 - 5.3 10e9/L    Absolute Monocytes 0.3 0.0 - 1.3 10e9/L    Absolute Eosinophils 0.1 0.0 - 0.7 10e9/L    Absolute Basophils 0.0 0.0 - 0.2 10e9/L    Abs Immature Granulocytes 0.0 0 - 0.4 10e9/L    Absolute Nucleated RBC 0.0    Troponin I   Result Value Ref Range    Troponin I ES 0.029 0.000 - 0.045 ug/L   Comprehensive metabolic panel   Result Value Ref Range    Sodium 142 133 - 144 mmol/L    Potassium 4.3 3.4 - 5.3 mmol/L    Chloride 108 94 - 109 mmol/L    Carbon Dioxide 27 20 - 32 mmol/L    Anion Gap 7 3 - 14 mmol/L    Glucose 89 70 - 99 mg/dL    Urea Nitrogen 12 7 - 30 mg/dL    Creatinine 0.70 0.52 - 1.04 mg/dL    GFR Estimate 86 >60 mL/min/1.7m2    GFR Estimate If Black >90 >60 mL/min/1.7m2    Calcium 9.1 8.5 - 10.1 mg/dL    Bilirubin Total 0.2 0.2 - 1.3 mg/dL    Albumin 3.5 3.4 - 5.0 g/dL    Protein Total 7.5 6.8 - 8.8 g/dL    Alkaline Phosphatase 69 40 - 150 U/L    ALT 16 0 - 50 U/L    AST 14 0 - 45 U/L   D dimer quantitative   Result Value Ref Range    D Dimer 0.3 0.0 - 0.50 ug/ml FEU   Nt probnp inpatient   Result Value Ref Range    N-Terminal Pro BNP Inpatient 220 0 - 900 pg/mL   Troponin I   Result Value Ref Range    Troponin I ES 0.026 0.000 - 0.045 ug/L   Troponin I   Result Value Ref Range    Troponin I ES <0.015 0.000 - 0.045 ug/L   TSH with free T4 reflex   Result Value Ref Range    TSH 2.60 0.40 -  4.00 mU/L   Comprehensive metabolic panel   Result Value Ref Range    Sodium 141 133 - 144 mmol/L    Potassium 4.0 3.4 - 5.3 mmol/L    Chloride 111 (H) 94 - 109 mmol/L    Carbon Dioxide 25 20 - 32 mmol/L    Anion Gap 5 3 - 14 mmol/L    Glucose 97 70 - 99 mg/dL    Urea Nitrogen 17 7 - 30 mg/dL    Creatinine 0.74 0.52 - 1.04 mg/dL    GFR Estimate 81 >60 mL/min/1.7m2    GFR Estimate If Black >90 >60 mL/min/1.7m2    Calcium 8.5 8.5 - 10.1 mg/dL    Bilirubin Total 0.2 0.2 - 1.3 mg/dL    Albumin 2.9 (L) 3.4 - 5.0 g/dL    Protein Total 6.5 (L) 6.8 - 8.8 g/dL    Alkaline Phosphatase 63 40 - 150 U/L    ALT 14 0 - 50 U/L    AST 12 0 - 45 U/L   CBC with platelets   Result Value Ref Range    WBC 4.8 4.0 - 11.0 10e9/L    RBC Count 4.13 3.8 - 5.2 10e12/L    Hemoglobin 11.0 (L) 11.7 - 15.7 g/dL    Hematocrit 34.7 (L) 35.0 - 47.0 %    MCV 84 78 - 100 fl    MCH 26.6 26.5 - 33.0 pg    MCHC 31.7 31.5 - 36.5 g/dL    RDW 14.0 10.0 - 15.0 %    Platelet Count 223 150 - 450 10e9/L   EKG 12 lead   Result Value Ref Range    Interpretation ECG Click View Image link to view waveform and result       Recent Results (from the past 48 hour(s))   XR Chest Port 1 View    Narrative    XR CHEST PORT 1 VW 4/12/2018 2:56 PM     HISTORY: Chest pain    COMPARISON: 10/18/2015      Impression    IMPRESSION: The heart size is normal. The lungs are clear. No evidence  of pneumothorax.    GEN NARANJO MD   CT Angiogram coronary artery    Narrative    Procedure: CTA ANGIOGRAM CORONARY ARTERY   Examination Date: 4/13/2018 11:02 AM   Indication:57 years Female  Chest pain radiating to the back and neck;     Ordering Provider: Mali HOPKINS  Overall quality of the study: Good.     PROCEDURE: ECG gated multi-slice computed tomography of the heart   with and without intravenous contrast  (Isovue 370, 110 mL) was   performed on a Siemens Dual Source Flash scanner without incident.  Beta-blockers were used to optimize heart rate (Metoprolol 50 mg  Oral).  Sublingual Nitrostat 0.4 mg was given prior to scanning.  Coronary artery calcium score was performed using the Flash scanner  protocol. CTA was performed in the spiral mode at a heart rate of 68  bpm with 100 kVp. Images were reconstructed and analyzed on a  [employer] workstation. Scan protocol was optimized to minimize  radiation exposure. The total radiation exposure including calcium  score was calculated to be 374 DLP, and 5.2 mSv.        Impression    IMPRESSION:  1.  No evidence of coronary atherosclerosis by CT angiography.  2.  Total Agatston score 0 placing the patient in the lowest  percentile when compared to age and gender matched control group.  3.  Please review Radiology report for incidental noncardiac findings  that will follow separately.    FINDINGS:    CORONARY CALCIUM SCORE    Total Agatston calcium score: 0   Left main: 0  Left anterior descendin  Left circumflex: 0  Right coronary artery: 0   This places the patient in the lowest  percentile when compared to age  and gender matched control group.    CORONARY ANGIOGRAPHY    DOMINANCE: Right dominant system.   Normal coronary origins and course.    LEFT MAIN:   The left main arises normally from the left coronary cusp and is  widely patent without any detectable stenosis.     LEFT ANTERIOR DESCENDING:   The left anterior descending and its major diagonal branches are  widely patent without any detectable stenosis.    RAMUS INTERMEDIUS:  The ramus intermedius is patent without any detectable stenosis.    LEFT CIRCUMFLEX:   The left circumflex and its major branches are widely patent without  any detectable stenosis.    RIGHT CORONARY ARTERY:   The right coronary artery and its major branches are widely patent  without any detectable stenosis.    ADDITIONAL FINDINGS:   The proximal ascending aorta is normal in size.   Normal pulmonary venous anatomy with all four pulmonary veins draining  into the left atrium.    There is no left ventricular  mass or thrombus.   Normal pericardial thickness. There is no pericardial effusion.  Please review Radiology report for incidental noncardiac findings that  will follow separately.    I have personally reviewed the examination and initial interpretation  and I agree with the findings.    JADEN REID MD   Radiologist Consult For Cardiology    Narrative    Radiology Consult to Cardiology, 4/13/2018 1:39 PM    History: Chest pain radiating to the back and neck    Comparison: Chest x-ray 4/12/2018, chest CTA 10/18/2015    Technique: Cardiac CT was performed by cardiology. Interpretation of  the noncardiac portions of the study was requested.  Field of view  extending from approximately the eric to the upper abdomen.    Contrast dose: iopamidol (ISOVUE-370) solution 110 mL    Findings:  No lymphadenopathy demonstrated. Heart and thoracic vasculature are  normal in size. Incidentally noted ramus intermedius. Otherwise  unremarkable appearance of the coronary arteries. No pericardial or  pleural effusion. No focal consolidation. There is a 4 mm right middle  lobe subpleural pulmonary nodule (axial series 5, image 20). Left  basilar platelike atelectasis. Imaged portions of the central airways  are clear. Visualized portions of the esophagus are normal.     Limited evaluation of the upper abdomen is unremarkable.    No suspicious osseous abnormality.       Impression    Impression:  1. 4 mm right middle lobe pulmonary nodule which is unchanged from  10/18/2015, statistically benign.  2. Please see cardiology dictation for detailed findings related to  the heart and mediastinum.    I have personally reviewed the examination and initial interpretation  and I agree with the findings.    ELMIRA WILSON MD                Pending Results:   Unresulted Labs Ordered in the Past 30 Days of this Admission     No orders found for last 61 day(s).                  Discharge Instructions and Follow-Up:     Discharge Procedure  Orders  Reason for your hospital stay   Order Comments: Chest pain     Follow Up and recommended labs and tests   Order Comments: Follow up with primary in 1 week     Activity   Order Comments: Your activity upon discharge: activity as tolerated   Order Specific Question Answer Comments   Is discharge order? Yes      When to contact your care team   Order Comments: Please go to your nearest emergency room If you were to have a change in the type of chest pain i.e more severe, lasting longer or radiating to your shoulder, arm, neck, jaw or back, shortness of breath or increased pain with breathing, coughing up blood, feel dizzy or lightheaded, or notice swelling in one leg.     Discharge Instructions   Order Comments: You were admitted to the observation unit for evaluation of your chest pain.  Your EKG was normal. Troponins (a lab test to check if there was damage to the heart tissue) were checked and these were negative. Chest x-ray was normal.  You underwent a stress test and this was normal. The chest pain you came into the emergency room for does not appear to be cardiac chest pain. I recommend continuing your home medications and it will be very important to follow up with your primary care doctor early next week or sooner if your symptoms return.     Full Code     Diet   Order Comments: Follow this diet upon discharge: Regular   Order Specific Question Answer Comments   Is discharge order? Yes             Attestation:  Kacie Thomason PA-C

## 2018-04-13 NOTE — PLAN OF CARE
Problem: Patient Care Overview  Goal: Plan of Care/Patient Progress Review    Observation goals  - Serial troponins and stress test complete - NO, CT angio in process.  - Seen and cleared by consultant if applicable - NO  - Adequate pain control on oral analgesia - YES  - Vital signs normal or at patient baseline - YES, BP has decreased. Last reading was 142/68.  - Safe disposition plan has been identified - NO   Nurse to notify provider when observation goals have been met and patient is ready for discharge.

## 2018-04-13 NOTE — PLAN OF CARE
Problem: Patient Care Overview  Goal: Plan of Care/Patient Progress Review  Outpatient/Observation goals to be met before discharge home:    List all goals to be met before discharge home:     - Serial troponins and stress test complete - NO, CT angio in am.  - Seen and cleared by consultant if applicable - NO  - Adequate pain control on oral analgesia - YES  - Vital signs normal or at patient baseline - NO, BP has been increased today.  - Safe disposition plan has been identified - NO    Nurse to notify provider when observation goals have been met and patient is ready for discharge.

## 2018-04-13 NOTE — PLAN OF CARE
Problem: Patient Care Overview  Goal: Plan of Care/Patient Progress Review    Observation goals  - Serial troponins and stress test complete - Partially met, CT angio to be read and interpreted  - Seen and cleared by consultant if applicable - NO  - Adequate pain control on oral analgesia - YES  - Vital signs normal or at patient baseline - YES  - Safe disposition plan has been identified - NO   Nurse to notify provider when observation goals have been met and patient is ready for discharge.

## 2018-04-13 NOTE — PLAN OF CARE
Problem: Patient Care Overview  Goal: Plan of Care/Patient Progress Review  Outpatient/Observation goals to be met before discharge home:    List all goals to be met before discharge home:     - Serial troponins and stress test complete - NO, CT angio in am.  - Seen and cleared by consultant if applicable - NO  - Adequate pain control on oral analgesia - YES  - Vital signs normal or at patient baseline - YES, BP has decreased. Last reading was 142/68.  - Safe disposition plan has been identified - NO    Nurse to notify provider when observation goals have been met and patient is ready for discharge.

## 2018-04-13 NOTE — H&P
[facility] ED Observation Admission Note    Chief Complaint   Patient presents with     Chest Pain     intermittent mid strenal chest pain and shortness of breath, increasing over the last 2 days       Assessment/Plan:  Hallie Huitron is a 57 year old female with a history of hypertension and high cholesterol. who presented to the ED with left sided chest pain which started 3 days ago.    1. Chest wall pain-  neck, and back pain/headache and palpitations: Patient was seen at [facility] 2 days ago. Chest xray and troponins completed there and were normal. Was diagnosed with chest pain and neck strain and was given Ibuprofen with some relief of her pain.Today:  EKG normal. Chest xray negative. Negative d dimer Troponins negative x 2. 2015, patient underwent an angio after a positive stress test. Angio showed no evidence of CAD. Reports chest pressure is worse than 3 days ago, with increased left sided headache and palpitations. Received Tylenol and aspirin in the ED.   - admit to observation  - q 4 hour vital signs  - serial troponins (x2 negative)   - CTA in am  - Norco for pain  - daily aspirin  - CBC and CMP in am  - Add on TSH    2. Hypertension -Patient is on Coreg and hydrochlorothiazide.  -   Hold am dose of Coreg tomorrow for cardiac testing   - Continue HCTZ  - Hydralazine prn SBP>160 DBP >90    3. Hyperlipidema - Last cholesterol check was 3/2018 at [facility]. Cholesterol 260 Trig 17 HDL 74       4. Latent TB - Continue PTA Rifampin    5. Anxiety/insomnia - PTA Xanax    HPI:    Hallie Huitron is a 57 year old female with a history of hypertension and high cholesterol. who presented to the ED with left sided chest pain which started 3 days ago. The chest pain is  constant left upper chest pain that radiates to her shoulder, neck, and left upper back. It is worse with movement. The pain is constant chest pressure with radiating sharp pain to her back. She reports a left sided headache and noted palpitations  today.  She is constantly short of breath. The patient also notes some facial swelling and was unable to open her eyes this morning due to the swelling. She has a history of angio without stents in 2015. No testing within the last year. Pt states car trip to [place] this weekend. Had some increase in bilateral generalized edema on the trip. STates she feels sob now, without any accessory muscle use or retractions. She denies any recent cough or cold. Patient was started on Rifampin for latent TB a few months ago. The patient presented with these symptoms 2 days ago at [facility]. Chest xray was completed and showed Mild linear atelectasis or scarring at the left lung base. Lungs are otherwise clear. Heart and pulmonary vasculature are normal. D dimer 0.31. Troponin 0.03. Patient received Ibuprofen and reported pain was better. Patient was diagnosed with neck strain and chest wall pain and was discharged.       Cardiac Risk Factors: The patient has a history of hypertension but no history of hyperlipidemia or diabetes mellitus. The patient has no personal or family history of heart disease. The patient has never smoked tobacco.     In the ED   Vitals:BP:199/102 Pulse: 74 Temp: 98.1 Resp: 18 SP02:100 %Labs:Na 142, K 4.3 cr 0.70 BUN 12, GFR >90, LFTS stable, , Troponin 0.029 and 0.026, WBC 5.3, Hgb 11.7, plt 238  Medications: Tylenol 1,000mg and Aspirin 325 mg  Imaging: chest xray negative, EKG normal  Consults: none Plan: Admit to ED observation for ACS rule out.     On admission to the observation unit the patient was stable. Reports constant chest pressure and left sided headache.     History:    Past Medical History:   Diagnosis Date     Cardiac angina (H)      Hypertension        Past Surgical History:   Procedure Laterality Date     CHOLECYSTECTOMY       HYSTERECTOMY         No family history on file.    Social History     Social History     Marital status:      Spouse name: N/A     Number  of children: N/A     Years of education: N/A     Occupational History     Not on file.     Social History Main Topics     Smoking status: Never Smoker     Smokeless tobacco: Never Used     Alcohol use No     Drug use: No     Sexual activity: Not on file     Other Topics Concern     Not on file     Social History Narrative     No narrative on file         No current facility-administered medications on file prior to encounter.   Current Outpatient Prescriptions on File Prior to Encounter:  [DISCONTINUED] carvedilol (COREG) 12.5 MG tablet Take 1 tablet (12.5 mg) by mouth 2 times daily (with meals) (Patient taking differently: Take 25 mg by mouth 2 times daily (with meals) )       Data:    Results for orders placed or performed during the hospital encounter of 04/12/18   XR Chest Port 1 View    Narrative    XR CHEST PORT 1 VW 4/12/2018 2:56 PM     HISTORY: Chest pain    COMPARISON: 10/18/2015      Impression    IMPRESSION: The heart size is normal. The lungs are clear. No evidence  of pneumothorax.    GEN NARANJO MD   CBC with platelets differential   Result Value Ref Range    WBC 5.3 4.0 - 11.0 10e9/L    RBC Count 4.38 3.8 - 5.2 10e12/L    Hemoglobin 11.7 11.7 - 15.7 g/dL    Hematocrit 36.3 35.0 - 47.0 %    MCV 83 78 - 100 fl    MCH 26.7 26.5 - 33.0 pg    MCHC 32.2 31.5 - 36.5 g/dL    RDW 13.5 10.0 - 15.0 %    Platelet Count 238 150 - 450 10e9/L    Diff Method Automated Method     % Neutrophils 47.1 %    % Lymphocytes 44.8 %    % Monocytes 5.1 %    % Eosinophils 2.4 %    % Basophils 0.2 %    % Immature Granulocytes 0.4 %    Nucleated RBCs 0 0 /100    Absolute Neutrophil 2.5 1.6 - 8.3 10e9/L    Absolute Lymphocytes 2.4 0.8 - 5.3 10e9/L    Absolute Monocytes 0.3 0.0 - 1.3 10e9/L    Absolute Eosinophils 0.1 0.0 - 0.7 10e9/L    Absolute Basophils 0.0 0.0 - 0.2 10e9/L    Abs Immature Granulocytes 0.0 0 - 0.4 10e9/L    Absolute Nucleated RBC 0.0    Troponin I   Result Value Ref Range    Troponin I ES 0.029 0.000 - 0.045  ug/L   Comprehensive metabolic panel   Result Value Ref Range    Sodium 142 133 - 144 mmol/L    Potassium 4.3 3.4 - 5.3 mmol/L    Chloride 108 94 - 109 mmol/L    Carbon Dioxide 27 20 - 32 mmol/L    Anion Gap 7 3 - 14 mmol/L    Glucose 89 70 - 99 mg/dL    Urea Nitrogen 12 7 - 30 mg/dL    Creatinine 0.70 0.52 - 1.04 mg/dL    GFR Estimate 86 >60 mL/min/1.7m2    GFR Estimate If Black >90 >60 mL/min/1.7m2    Calcium 9.1 8.5 - 10.1 mg/dL    Bilirubin Total 0.2 0.2 - 1.3 mg/dL    Albumin 3.5 3.4 - 5.0 g/dL    Protein Total 7.5 6.8 - 8.8 g/dL    Alkaline Phosphatase 69 40 - 150 U/L    ALT 16 0 - 50 U/L    AST 14 0 - 45 U/L   D dimer quantitative   Result Value Ref Range    D Dimer 0.3 0.0 - 0.50 ug/ml FEU   Nt probnp inpatient   Result Value Ref Range    N-Terminal Pro BNP Inpatient 220 0 - 900 pg/mL   EKG 12 lead   Result Value Ref Range    Interpretation ECG Click View Image link to view waveform and result              EKG Interpretation:      Interpreted by Davina Fontaine  Time reviewed: per Epic  Symptoms at time of EKG: chest pain   Rhythm: normal sinus   Rate: normal  Axis: normal  Ectopy: none  Conduction: normal  ST Segments/ T Waves: No ST-T wave changes  Q Waves: none  Comparison to prior: No old EKG available     Clinical Impression: normal EKG       ROS:    Review Of Systems  Skin: Swelling of face  Eyes: negative  Ears/Nose/Throat: negative  Respiratory: Shortness of breath-  and Dyspnea on exertion-   Cardiovascular: palpitations and chest pain and exertional chest pressure  Gastrointestinal: negative  Genitourinary: negative  Musculoskeletal: back pain and neck pain  Neurologic: headaches  Psychiatric: negative  Hematologic/Lymphatic/Immunologic: negative  Endocrine: negative      10 point ROS negative other than the symptoms noted above.      Exam:    Vitals:  B/P: 152/81, T: 98.1, P: 71, R: 18    Constitutional: healthy, alert and no distress   Head: Normocephalic. No masses, lesions, tenderness or  abnormalities   Neck: Neck supple. No adenopathy. Thyroid symmetric, normal size,, Carotids without bruits.   ENT: ENT exam normal, no neck nodes or sinus tenderness   Cardiovascular: RRR. No murmurs, clicks gallops or rub. Pain with palpation of left chest wall.  Respiratory: . Good diaphragmatic excursion. Lungs clear   Gastrointestinal: Abdomen soft,. BS normal. No masses, organomegaly.   : Deferred   Musculoskeletal: extremities normal- no gross deformities noted, gait normal and normal muscle tone   Skin: no suspicious lesions or rashes   Neurologic: Gait normal. Reflexes normal and symmetric. Sensation grossly WNL.   Psychiatric: mentation appears normal and affect normal/bright   Hematologic/Lymphatic/Immunologic: normal ant/post cervical, axillary, supraclavicular and inguinal     Consults: none  FEN: Cardiac diet no caffeine, npo at midnight  DVT prophylaxis: early ambulation  Disposition: Home tomorrow if troponins negative and testing completed in am.     Signed:  KELLIE Hester, NP  Emergency Department  [phone] Ex [id number]  April 12, 2018 at 7:30 PM

## 2020-08-06 ENCOUNTER — HOSPITAL ENCOUNTER (EMERGENCY)
Facility: CLINIC | Age: 60
Discharge: HOME | End: 2020-08-06
Attending: EMERGENCY MEDICINE | Admitting: EMERGENCY MEDICINE
Payer: COMMERCIAL

## 2020-08-06 ENCOUNTER — APPOINTMENT (OUTPATIENT)
Dept: GENERAL RADIOLOGY | Facility: CLINIC | Age: 60
End: 2020-08-06
Attending: EMERGENCY MEDICINE
Payer: COMMERCIAL

## 2020-08-06 VITALS
SYSTOLIC BLOOD PRESSURE: 121 MMHG | DIASTOLIC BLOOD PRESSURE: 72 MMHG | HEART RATE: 65 BPM | TEMPERATURE: 98.7 F | HEIGHT: 66 IN | RESPIRATION RATE: 9 BRPM | WEIGHT: 185 LBS | BODY MASS INDEX: 29.73 KG/M2 | OXYGEN SATURATION: 98 %

## 2020-08-06 DIAGNOSIS — I16.0 HYPERTENSIVE URGENCY: ICD-10-CM

## 2020-08-06 LAB
ALBUMIN SERPL-MCNC: 3.9 G/DL (ref 3.4–5)
ALP SERPL-CCNC: 104 U/L (ref 40–150)
ALT SERPL W P-5'-P-CCNC: 23 U/L (ref 0–50)
ANION GAP SERPL CALCULATED.3IONS-SCNC: 3 MMOL/L (ref 3–14)
AST SERPL W P-5'-P-CCNC: 20 U/L (ref 0–45)
BILIRUB SERPL-MCNC: 0.3 MG/DL (ref 0.2–1.3)
BUN SERPL-MCNC: 21 MG/DL (ref 7–30)
CALCIUM SERPL-MCNC: 9.4 MG/DL (ref 8.5–10.1)
CHLORIDE SERPL-SCNC: 100 MMOL/L (ref 94–109)
CO2 SERPL-SCNC: 31 MMOL/L (ref 20–32)
CREAT SERPL-MCNC: 1.04 MG/DL (ref 0.52–1.04)
ERYTHROCYTE [DISTWIDTH] IN BLOOD BY AUTOMATED COUNT: 12.9 % (ref 10–15)
GFR SERPL CREATININE-BSD FRML MDRD: 59 ML/MIN/{1.73_M2}
GLUCOSE SERPL-MCNC: 100 MG/DL (ref 70–99)
HCT VFR BLD AUTO: 40.1 % (ref 35–47)
HGB BLD-MCNC: 13.2 G/DL (ref 11.7–15.7)
INTERPRETATION ECG - MUSE: NORMAL
MCH RBC QN AUTO: 26.6 PG (ref 26.5–33)
MCHC RBC AUTO-ENTMCNC: 32.9 G/DL (ref 31.5–36.5)
MCV RBC AUTO: 81 FL (ref 78–100)
PLATELET # BLD AUTO: 300 10E9/L (ref 150–450)
POTASSIUM SERPL-SCNC: 3.9 MMOL/L (ref 3.4–5.3)
PROT SERPL-MCNC: 8.1 G/DL (ref 6.8–8.8)
RBC # BLD AUTO: 4.97 10E12/L (ref 3.8–5.2)
SODIUM SERPL-SCNC: 134 MMOL/L (ref 133–144)
TROPONIN I SERPL-MCNC: <0.015 UG/L (ref 0–0.04)
WBC # BLD AUTO: 8.6 10E9/L (ref 4–11)

## 2020-08-06 PROCEDURE — 99285 EMERGENCY DEPT VISIT HI MDM: CPT | Mod: 25

## 2020-08-06 PROCEDURE — 25000128 H RX IP 250 OP 636: Performed by: EMERGENCY MEDICINE

## 2020-08-06 PROCEDURE — 84484 ASSAY OF TROPONIN QUANT: CPT | Performed by: EMERGENCY MEDICINE

## 2020-08-06 PROCEDURE — 85027 COMPLETE CBC AUTOMATED: CPT | Performed by: EMERGENCY MEDICINE

## 2020-08-06 PROCEDURE — 80053 COMPREHEN METABOLIC PANEL: CPT | Performed by: EMERGENCY MEDICINE

## 2020-08-06 PROCEDURE — 71046 X-RAY EXAM CHEST 2 VIEWS: CPT

## 2020-08-06 PROCEDURE — 96374 THER/PROPH/DIAG INJ IV PUSH: CPT

## 2020-08-06 PROCEDURE — 25000132 ZZH RX MED GY IP 250 OP 250 PS 637: Performed by: EMERGENCY MEDICINE

## 2020-08-06 PROCEDURE — 93005 ELECTROCARDIOGRAM TRACING: CPT

## 2020-08-06 RX ORDER — CARVEDILOL 25 MG/1
25 TABLET ORAL 2 TIMES DAILY WITH MEALS
Qty: 60 TABLET | Refills: 0 | Status: SHIPPED | OUTPATIENT
Start: 2020-08-06

## 2020-08-06 RX ORDER — CARVEDILOL 25 MG/1
25 TABLET ORAL ONCE
Status: COMPLETED | OUTPATIENT
Start: 2020-08-06 | End: 2020-08-06

## 2020-08-06 RX ORDER — HYDRALAZINE HYDROCHLORIDE 20 MG/ML
10 INJECTION INTRAMUSCULAR; INTRAVENOUS ONCE
Status: COMPLETED | OUTPATIENT
Start: 2020-08-06 | End: 2020-08-06

## 2020-08-06 RX ADMIN — CARVEDILOL 25 MG: 25 TABLET, FILM COATED ORAL at 22:15

## 2020-08-06 RX ADMIN — HYDRALAZINE HYDROCHLORIDE 10 MG: 20 INJECTION INTRAMUSCULAR; INTRAVENOUS at 21:48

## 2020-08-06 ASSESSMENT — ENCOUNTER SYMPTOMS
BACK PAIN: 1
DIZZINESS: 1
VOMITING: 0
FEVER: 0
CHILLS: 0
NUMBNESS: 0

## 2020-08-06 ASSESSMENT — MIFFLIN-ST. JEOR: SCORE: 1430.9

## 2020-08-06 NOTE — ED TRIAGE NOTES
Pt has had chest pressure and hypertension x 3 days. Started taking htn meds, sertraline and celebryx 10 days ago. Having chest pressure today.

## 2020-08-06 NOTE — ED AVS SNAPSHOT
Emergency Department  [address] [place] [id number]  Phone:  [phone]  Fax:  [phone]                                    Hallie Huitron   MRN: [id number]    Department:   Emergency Department   Date of Visit:  8/6/2020           After Visit Summary Signature Page    I have received my discharge instructions, and my questions have been answered. I have discussed any challenges I see with this plan with the nurse or doctor.    ..........................................................................................................................................  Patient/Patient Representative Signature      ..........................................................................................................................................  Patient Representative Print Name and Relationship to Patient    ..................................................               ................................................  Date                                   Time    ..........................................................................................................................................  Reviewed by Signature/Title    ...................................................              ..............................................  Date                                               Time          22EPIC Rev 08/18

## 2020-08-07 NOTE — ED PROVIDER NOTES
History     Chief Complaint:  Chest Pain and Hypertension    The history is provided by the patient and a relative.     Hallie Huitron is a 59 year old female currently on hydrochlorothiazide with a history of hypertension, cardiac angina, cardiac ischemia, and hyperlipidemia, who presents for evaluation of radiating chest pain and hypertension that first began 3 days prior. In this time, she reports regularly taking her blood pressure at home and that her highest recorded reading was 193/103. She reports taking Tylenol without relief of symptoms.    Here, she reports continued chest pain that radiates to her back, constant chest pressure, dizziness, and hypertension. She denies vomiting, fever, chills, cough or cold symptoms, numbness, tingling, diplopia, or other symptoms. Of note, the patient reports that her hypertension medicines have changed within the past eight months from taking both hydrochlorothiazide and carvedilol to only taking hydrochlorothiazide. She also was started on Zoloft 7/23.    Allergies:  No Known Drug Allergies    Medications:   Atorvastatin  Carvedilol  Celecoxib  Cyclobenzaprine HCl  Hydrochlorothiazide  Rifampin  Desyrel  Zoloft    Medical History:   Cardiac angina  Hypertension  Cardiac ischemia  Hyperlipidemia    Surgical History   Cholecystectomy  Hysterectomy    Family History:   Mother - depression, hypercholesteremia, hypertension, osteoporosis  Brother - hypercholesteremia    Social History:  The patient was accompanied to the ED by a family member.  Smoking Status: Never  Smokeless Tobacco: Never  Alcohol Use: No  Drug Use: No       Review of Systems   Constitutional: Negative for chills and fever.   Eyes: Negative for visual disturbance (diplopia).   Respiratory:        Positive chest pressure   Cardiovascular: Positive for chest pain.   Gastrointestinal: Negative for vomiting.   Musculoskeletal: Positive for back pain.   Neurological: Positive for dizziness. Negative for numbness  "(or tingling).   All other systems reviewed and are negative.      Physical Exam     Patient Vitals for the past 24 hrs:   BP Temp Temp src Pulse Resp SpO2 Height Weight   08/06/20 1727 (!) 215/97 98.7  F (37.1  C) Temporal 94 18 98 % 1.676 m (5' 6\") 83.9 kg (185 lb)          Physical Exam  Nursing note and vitals reviewed.  Constitutional:  Appears well-developed and well-nourished.   HENT:   Head:    Atraumatic.   Mouth/Throat:   Oropharynx is clear and moist. No oropharyngeal exudate.   Eyes:    Pupils are equal, round, and reactive to light.   Neck:    Normal range of motion. Neck supple.      No tracheal deviation present. No thyromegaly present.   Cardiovascular:  Normal rate, regular rhythm, no murmur   Pulmonary/Chest: Breath sounds are clear and equal without wheezes or crackles.  Abdominal:   Soft. Bowel sounds are normal. Exhibits no distension and      no mass. There is no tenderness.      There is no rebound and no guarding.   Musculoskeletal:  Exhibits no edema.   Lymphadenopathy:  No cervical adenopathy.   Neurological:   Alert and oriented to person, place, and time. GCS 15.  CN 2-12 intact.  and proximal upper extremity strength strong and equal.  Bilateral lower extremity strength strong and equal, including strong dorsiflexion and plantarflexion strength.  Sensation intact and equal to the face, arms and legs.  No facial droop or weakness. Normal speech.  Follows commands and answers questions normally.    Skin:    Skin is warm and dry. No rash noted. No pallor.       Emergency Department Course     ECG:  ECG taken at 1734.  Normal sinus rhythm  Normal ECG  When compared with ECG of 12Apr2018 1420  No significant change was found.  Rate 87 bpm. SD interval 158 ms. QRS duration 78 ms. QT/QTc 362/435 ms. P-R-T axes 64 41 44.    Imaging:  Radiology results were communicated with the patient and family who voiced understanding of the findings.    Chest XR, PA & LAT:  IMPRESSION: PA and lateral " views of the chest were obtained.   Cardiomediastinal silhouette is within normal limits. No suspicious   focal pulmonary opacities. No significant pleural effusion or   pneumothorax. Right upper quadrant post cholecystectomy clips.   Reading per radiology.    Laboratory:  Laboratory findings were communicated with the patient and family who voiced understanding of the findings.     CBC: WBC 8.6, HGB 13.2,   CMP: Glucose 100 (H), (Creatinine 1.04) o/w WNL   Troponin (Collected 1833): <0.015    Interventions:   2148 Hydralazine 10 mg IV  2215 Carvedilol 25 mg PO    Emergency Department Course:  1743 EKG obtained as noted above.     2048 Nursing notes and vitals reviewed.    2108 I performed an exam of the patient as documented above.     1833 IV was inserted and blood was drawn for laboratory testing, results above.    2129 The patient was sent for XR while in the emergency department, results above.     2255 Findings and plan explained to the Patient. Patient discharged home with instructions regarding supportive care, medications, and reasons to return. The importance of close follow-up was reviewed.    Impression & Plan     Medical Decision Making:  I found this patient to have acute hypertensive urgency without any sign of aortic dissection, stroke, pulmonary embolism, or myocardial infarction. She was started back on her Coreg and given and IV dose of hydralazine with resolution of her symptoms and feeling much improved with improvement of her blood pressure. Her troponin is negative and her chest X-Ray is normal without any immediate sign of widening and I do not feel that there was concern for aortic dissection or stroke. I felt she could be safely discharged home. She was told to follow up with her primary doctor tomorrow and to return if symptoms worsen. She was told to stop the Celecoxib in case this was a contributing factor to her accelerated HTN and take Tylenol as needed instead.      Diagnosis:      ICD-10-CM    1. Hypertensive urgency  I16.0         Disposition:  Discharged to home.    Scribe Disclosure:  I, Madelyn Chowdhury, am serving as a scribe at 8:58 PM on 8/6/2020 to document services personally performed by Aida Buitrago MD based on my observations and the provider's statements to me.     Scribe Disclosure:  I, Angel Hammer, am serving as a scribe at 12:08 AM on 8/7/2020 to document services personally performed by Aida Buitrago MD based on my observations and the provider's statements to me.          Aida Buitrago MD  08/07/20 0032

## 2020-08-07 NOTE — ED NOTES
Patient rates chest pain 8/10.  Chest pressure.  Complaints of headache.  BP remains elevated.  191/101.  Daughter at bedside.  Does not take daily aspirin.  Some dizziness also.

## 2022-01-29 ENCOUNTER — APPOINTMENT (OUTPATIENT)
Dept: CT IMAGING | Facility: CLINIC | Age: 62
End: 2022-01-29
Attending: FAMILY MEDICINE
Payer: COMMERCIAL

## 2022-01-29 ENCOUNTER — HOSPITAL ENCOUNTER (EMERGENCY)
Facility: CLINIC | Age: 62
Discharge: HOME | End: 2022-01-29
Attending: FAMILY MEDICINE | Admitting: FAMILY MEDICINE
Payer: COMMERCIAL

## 2022-01-29 VITALS
BODY MASS INDEX: 34.38 KG/M2 | WEIGHT: 213 LBS | HEART RATE: 68 BPM | TEMPERATURE: 97.8 F | RESPIRATION RATE: 16 BRPM | OXYGEN SATURATION: 98 % | DIASTOLIC BLOOD PRESSURE: 76 MMHG | SYSTOLIC BLOOD PRESSURE: 145 MMHG

## 2022-01-29 DIAGNOSIS — I10 PRIMARY HYPERTENSION: ICD-10-CM

## 2022-01-29 LAB
ALBUMIN SERPL-MCNC: 3.4 G/DL (ref 3.4–5)
ALP SERPL-CCNC: 78 U/L (ref 40–150)
ALT SERPL W P-5'-P-CCNC: 20 U/L (ref 0–50)
ANION GAP SERPL CALCULATED.3IONS-SCNC: 4 MMOL/L (ref 3–14)
AST SERPL W P-5'-P-CCNC: 14 U/L (ref 0–45)
BASOPHILS # BLD AUTO: 0 10E3/UL (ref 0–0.2)
BASOPHILS NFR BLD AUTO: 0 %
BILIRUB SERPL-MCNC: 0.3 MG/DL (ref 0.2–1.3)
BUN SERPL-MCNC: 14 MG/DL (ref 7–30)
CALCIUM SERPL-MCNC: 9.1 MG/DL (ref 8.5–10.1)
CHLORIDE BLD-SCNC: 108 MMOL/L (ref 94–109)
CO2 SERPL-SCNC: 28 MMOL/L (ref 20–32)
CREAT SERPL-MCNC: 0.78 MG/DL (ref 0.52–1.04)
EOSINOPHIL # BLD AUTO: 0.2 10E3/UL (ref 0–0.7)
EOSINOPHIL NFR BLD AUTO: 3 %
ERYTHROCYTE [DISTWIDTH] IN BLOOD BY AUTOMATED COUNT: 14.6 % (ref 10–15)
GFR SERPL CREATININE-BSD FRML MDRD: 86 ML/MIN/1.73M2
GLUCOSE BLD-MCNC: 99 MG/DL (ref 70–99)
HCT VFR BLD AUTO: 34.8 % (ref 35–47)
HGB BLD-MCNC: 10.9 G/DL (ref 11.7–15.7)
IMM GRANULOCYTES # BLD: 0 10E3/UL
IMM GRANULOCYTES NFR BLD: 1 %
LYMPHOCYTES # BLD AUTO: 2.3 10E3/UL (ref 0.8–5.3)
LYMPHOCYTES NFR BLD AUTO: 37 %
MCH RBC QN AUTO: 25.3 PG (ref 26.5–33)
MCHC RBC AUTO-ENTMCNC: 31.3 G/DL (ref 31.5–36.5)
MCV RBC AUTO: 81 FL (ref 78–100)
MONOCYTES # BLD AUTO: 0.5 10E3/UL (ref 0–1.3)
MONOCYTES NFR BLD AUTO: 8 %
NEUTROPHILS # BLD AUTO: 3.1 10E3/UL (ref 1.6–8.3)
NEUTROPHILS NFR BLD AUTO: 51 %
NRBC # BLD AUTO: 0 10E3/UL
NRBC BLD AUTO-RTO: 0 /100
NT-PROBNP SERPL-MCNC: 249 PG/ML (ref 0–900)
PLATELET # BLD AUTO: 261 10E3/UL (ref 150–450)
POTASSIUM BLD-SCNC: 3.9 MMOL/L (ref 3.4–5.3)
PROT SERPL-MCNC: 7.2 G/DL (ref 6.8–8.8)
RBC # BLD AUTO: 4.3 10E6/UL (ref 3.8–5.2)
SODIUM SERPL-SCNC: 140 MMOL/L (ref 133–144)
TROPONIN I SERPL HS-MCNC: 26 NG/L
TSH SERPL DL<=0.005 MIU/L-ACNC: 2.99 MU/L (ref 0.4–4)
WBC # BLD AUTO: 6.1 10E3/UL (ref 4–11)

## 2022-01-29 PROCEDURE — 83880 ASSAY OF NATRIURETIC PEPTIDE: CPT | Performed by: FAMILY MEDICINE

## 2022-01-29 PROCEDURE — 250N000013 HC RX MED GY IP 250 OP 250 PS 637: Performed by: FAMILY MEDICINE

## 2022-01-29 PROCEDURE — 99285 EMERGENCY DEPT VISIT HI MDM: CPT | Mod: 25 | Performed by: FAMILY MEDICINE

## 2022-01-29 PROCEDURE — 93005 ELECTROCARDIOGRAM TRACING: CPT | Performed by: FAMILY MEDICINE

## 2022-01-29 PROCEDURE — 85025 COMPLETE CBC W/AUTO DIFF WBC: CPT | Performed by: FAMILY MEDICINE

## 2022-01-29 PROCEDURE — 80053 COMPREHEN METABOLIC PANEL: CPT | Performed by: FAMILY MEDICINE

## 2022-01-29 PROCEDURE — 84484 ASSAY OF TROPONIN QUANT: CPT | Performed by: FAMILY MEDICINE

## 2022-01-29 PROCEDURE — 82040 ASSAY OF SERUM ALBUMIN: CPT | Performed by: FAMILY MEDICINE

## 2022-01-29 PROCEDURE — 36415 COLL VENOUS BLD VENIPUNCTURE: CPT | Performed by: FAMILY MEDICINE

## 2022-01-29 PROCEDURE — 70450 CT HEAD/BRAIN W/O DYE: CPT

## 2022-01-29 PROCEDURE — 93010 ELECTROCARDIOGRAM REPORT: CPT | Performed by: FAMILY MEDICINE

## 2022-01-29 PROCEDURE — 84443 ASSAY THYROID STIM HORMONE: CPT | Performed by: FAMILY MEDICINE

## 2022-01-29 RX ORDER — ACETAMINOPHEN 500 MG
1000 TABLET ORAL ONCE
Status: COMPLETED | OUTPATIENT
Start: 2022-01-29 | End: 2022-01-29

## 2022-01-29 RX ORDER — HYDROCHLOROTHIAZIDE 25 MG/1
25 TABLET ORAL 2 TIMES DAILY
COMMUNITY
Start: 2022-01-28 | End: 2022-02-27

## 2022-01-29 RX ADMIN — ACETAMINOPHEN 1000 MG: 500 TABLET ORAL at 15:31

## 2022-01-29 ASSESSMENT — ENCOUNTER SYMPTOMS
NECK STIFFNESS: 0
EYE REDNESS: 0
CONFUSION: 0
SHORTNESS OF BREATH: 0
HEADACHES: 0
COLOR CHANGE: 0
COUGH: 0
WEAKNESS: 0
DIFFICULTY URINATING: 0
RHINORRHEA: 0
FEVER: 0
DYSURIA: 0
ARTHRALGIAS: 0
NUMBNESS: 0
LIGHT-HEADEDNESS: 1
ABDOMINAL PAIN: 0

## 2022-01-29 NOTE — DISCHARGE INSTRUCTIONS
Thank you for choosing [facility].     Please closely monitor for further symptoms. Return to the Emergency Department if you develop any new or worsening signs or symptoms.    If you received any opiate pain medications or sedatives during your visit, please do not drive for at least 8 hours.     Labs, cultures or final xray interpretations may still need to be reviewed.  We will call you if your plan of care needs to be changed.    Continue your current blood pressure medications, measure blood pressure cuff at home to monitor, avoid salt in diet and may use Tylenol for headache and Benadryl for any facial swelling or itching.  Please follow up with your primary care physician or clinic in 3 to 5 days to recheck blood pressure.

## 2022-01-29 NOTE — ED TRIAGE NOTES
Patient was seen at urgent care yesterday and prescription for hydrochlorothiazide was increase to BID. Patient continues to have headache, eye pressure, neck pain.

## 2022-01-29 NOTE — ED PROVIDER NOTES
[facility] EMERGENCY DEPARTMENT ([facility])       1/29/22  History     Chief Complaint   Patient presents with     Hypertension     Elevated BP x 4 days accompanied by headache, neck pain, dizziness, swelling in the upper and lower extremity.      The history is provided by the patient, medical records and a relative (daughter).     Hallie Huitron is a 61 year old female with a past medical history significant for cardiac ischemia (2015), hypertension, and cardiac angina who presents to the Emergency Department for evaluation of hypertension and facial swelling.     Patient reports that she has been experiencing 4 to 5 days of facial swelling and eye lid puffiness.  She states that for the last 4 to 5 days she has been experiencing high blood pressures at home.  She says that she has a home monitor and 2 days ago her blood pressure was in the 170s/90s.  She adds that she has chronic hypertension and her blood pressure is normally in the 150s.  She notes that she is also been experiencing headaches and pressure/ringing-like sensation in both ears. She adds that she was seen by urgent care last night and was increased on her hydrochlorothiazide to 25 mg twice daily.  She says that she is taken 2 doses of this medication once last night and this morning and has had no change in symptoms.  She adds that today she has felt like her vision is slightly blurry and not completely clear.  She states that she does have reading glasses at home but endorses that her is not at baseline.  She adds that she has lightheadedness associated with her symptoms today.  Patient denies any holes in her vision.  Patient denies loss of appetite.  Patient denies dysuria, difficulty urinating, urgency, runny nose, cough, numbness or tingling, generalized weakness, and skin itchiness.  Patient denies new dietary changes.  Patient denies newly prescribed medications.  Patient denies use of new face creams.  Patient denies known diagnosis  of diabetes.  Patient denies use of decongestions.  Patient denies issues with walking.    Per patient's daughter, patient was very puffy in the eyes and face this morning. She states that this worried her along with not seeing changes with the patients recent medication change.       Past Medical History:   Diagnosis Date     Cardiac angina (H)      Hypertension        Past Surgical History:   Procedure Laterality Date     CHOLECYSTECTOMY       HYSTERECTOMY       ORTHOPEDIC SURGERY      knee replacement Right       History reviewed. No pertinent family history.    Social History     Tobacco Use     Smoking status: Never Smoker     Smokeless tobacco: Never Used   Substance Use Topics     Alcohol use: No       No current facility-administered medications for this encounter.     Current Outpatient Medications   Medication     carvedilol (COREG) 25 MG tablet     hydrochlorothiazide (HYDRODIURIL) 25 MG tablet     HYDROCHLOROTHIAZIDE PO     acetaminophen (TYLENOL) 650 MG CR tablet     ATORVASTATIN CALCIUM PO     Celecoxib (CELEBREX PO)     cod liver oil CAPS capsule     CYCLOBENZAPRINE HCL PO     RIFAMPIN PO      No Known Allergies      I have reviewed the Medications, Allergies, Past Medical and Surgical History, and Social History in the Epic system.    Review of Systems   Constitutional: Negative for fever.   HENT: Negative for congestion and rhinorrhea.         Positive for facial swelling   Eyes: Positive for visual disturbance (mild vision blurriness). Negative for redness.   Respiratory: Negative for cough and shortness of breath.    Cardiovascular: Negative for chest pain.   Gastrointestinal: Negative for abdominal pain.   Genitourinary: Negative for decreased urine volume, difficulty urinating, dysuria and urgency.   Musculoskeletal: Negative for arthralgias and neck stiffness.   Skin: Negative for color change.   Neurological: Positive for light-headedness (mild). Negative for weakness, numbness and headaches.    Psychiatric/Behavioral: Negative for confusion.     A complete review of systems was performed with pertinent positives and negatives noted in the HPI, and all other systems negative.    Physical Exam   BP: (!) 164/80  Pulse: 76  Temp: 97.8  F (36.6  C)  Resp: 16  Weight: 96.6 kg (213 lb)  SpO2: 98 %      Physical Exam  Vitals and nursing note reviewed.   Constitutional:       General: She is not in acute distress.     Appearance: She is not diaphoretic.   HENT:      Head: Atraumatic.      Mouth/Throat:      Pharynx: No oropharyngeal exudate.   Eyes:      General: No scleral icterus.     Extraocular Movements: Extraocular movements intact.      Conjunctiva/sclera: Conjunctivae normal.      Pupils: Pupils are equal, round, and reactive to light.      Funduscopic exam:     Right eye: No papilledema.         Left eye: No papilledema.      Comments: Very subtle swelling of upper eyelids noted   Cardiovascular:      Heart sounds: Normal heart sounds.   Pulmonary:      Effort: No respiratory distress.      Breath sounds: Normal breath sounds.   Abdominal:      General: Bowel sounds are normal.      Palpations: Abdomen is soft.      Tenderness: There is no abdominal tenderness.   Musculoskeletal:         General: No tenderness.   Skin:     General: Skin is warm.      Findings: No rash.   Neurological:      General: No focal deficit present.      Mental Status: She is alert and oriented to person, place, and time.      Sensory: Sensation is intact.      Coordination: Coordination is intact.      Gait: Gait is intact.         ED Course     At 2:05 PM the patient was seen and examined by Jona Trujillo MD in Room ED06.        Procedures            EKG Interpretation:      Interpreted by Jona Trujillo MD  Time reviewed: 1500  Symptoms at time of EKG: Elevated blood pressure  Rhythm: normal sinus   Rate: Normal  Axis: Normal  Ectopy: none  Conduction: normal  ST Segments/ T Waves: No ST-T wave changes and No acute ischemic  changes  Q Waves: none  Comparison to prior: Unchanged from August 2020    Clinical Impression: normal EKG                The medical record was reviewed and interpreted.  Current labs reviewed and interpreted.  Previous labs reviewed and interpreted.  Current images reviewed and interpreted: CT head normal.         Results for orders placed or performed during the hospital encounter of 01/29/22 (from the past 24 hour(s))   CBC with platelets differential    Narrative    The following orders were created for panel order CBC with platelets differential.  Procedure                               Abnormality         Status                     ---------                               -----------         ------                     CBC with platelets and d...[[id number]]  Abnormal            Final result                 Please view results for these tests on the individual orders.   Comprehensive metabolic panel   Result Value Ref Range    Sodium 140 133 - 144 mmol/L    Potassium 3.9 3.4 - 5.3 mmol/L    Chloride 108 94 - 109 mmol/L    Carbon Dioxide (CO2) 28 20 - 32 mmol/L    Anion Gap 4 3 - 14 mmol/L    Urea Nitrogen 14 7 - 30 mg/dL    Creatinine 0.78 0.52 - 1.04 mg/dL    Calcium 9.1 8.5 - 10.1 mg/dL    Glucose 99 70 - 99 mg/dL    Alkaline Phosphatase 78 40 - 150 U/L    AST 14 0 - 45 U/L    ALT 20 0 - 50 U/L    Protein Total 7.2 6.8 - 8.8 g/dL    Albumin 3.4 3.4 - 5.0 g/dL    Bilirubin Total 0.3 0.2 - 1.3 mg/dL    GFR Estimate 86 >60 mL/min/1.73m2   Troponin I   Result Value Ref Range    Troponin I High Sensitivity 26 <54 ng/L   TSH with free T4 reflex   Result Value Ref Range    TSH 2.99 0.40 - 4.00 mU/L   CBC with platelets and differential   Result Value Ref Range    WBC Count 6.1 4.0 - 11.0 10e3/uL    RBC Count 4.30 3.80 - 5.20 10e6/uL    Hemoglobin 10.9 (L) 11.7 - 15.7 g/dL    Hematocrit 34.8 (L) 35.0 - 47.0 %    MCV 81 78 - 100 fL    MCH 25.3 (L) 26.5 - 33.0 pg    MCHC 31.3 (L) 31.5 - 36.5 g/dL    RDW 14.6 10.0 - 15.0  %    Platelet Count 261 150 - 450 10e3/uL    % Neutrophils 51 %    % Lymphocytes 37 %    % Monocytes 8 %    % Eosinophils 3 %    % Basophils 0 %    % Immature Granulocytes 1 %    NRBCs per 100 WBC 0 <1 /100    Absolute Neutrophils 3.1 1.6 - 8.3 10e3/uL    Absolute Lymphocytes 2.3 0.8 - 5.3 10e3/uL    Absolute Monocytes 0.5 0.0 - 1.3 10e3/uL    Absolute Eosinophils 0.2 0.0 - 0.7 10e3/uL    Absolute Basophils 0.0 0.0 - 0.2 10e3/uL    Absolute Immature Granulocytes 0.0 <=0.4 10e3/uL    Absolute NRBCs 0.0 10e3/uL   Nt probnp inpatient (BNP)   Result Value Ref Range    N terminal Pro BNP Inpatient 249 0 - 900 pg/mL   CT Head w/o Contrast    Narrative    EXAM: CT HEAD W/O CONTRAST  LOCATION: [facility]  DATE/TIME: 1/29/2022 2:30 PM    INDICATION: Headache, eye pressure, neck pain  COMPARISON: None.  TECHNIQUE: Routine CT Head without IV contrast. Multiplanar reformats. Dose reduction techniques were used.    FINDINGS:  INTRACRANIAL CONTENTS: No intracranial hemorrhage, extraaxial collection, or mass effect.  No CT evidence of acute infarct. Normal parenchymal attenuation. Normal ventricles and sulci. Empty sella.    VISUALIZED ORBITS/SINUSES/MASTOIDS: No intraorbital abnormality. No paranasal sinus mucosal disease. No middle ear or mastoid effusion.    BONES/SOFT TISSUES: No acute abnormality.      Impression    IMPRESSION:  1.  No acute intracranial process.     Medications   acetaminophen (TYLENOL) tablet 1,000 mg (1,000 mg Oral Given 1/29/22 1531)             Assessments & Plan (with Medical Decision Making)   A 61-year-old woman with a history of hypertension presenting due to elevated blood pressure and noticing swelling around her face and eyelids with mild generalized headache.  Differential diagnosis includes exacerbation essential hypertension related to dietary or medication factors, acute kidney injury, ACS, CHF, less likely intracranial catastrophe such as bleed  stroke or mass, allergic reaction, withdrawal syndrome.  On exam her initial blood pressure was in the 150s over 80s, on recheck 148/77.  Her other vitals are normal.  Her mental status is normal.  She has very subtle swelling of the eyelids but no swelling of lips, tongue, soft palate or uvula.  Neck is supple with no JVD, lungs clear, cardiovascular exam normal, there is no extremity edema appreciated.  Her neurologic exam is normal.  The remainder of a complete physical exam is normal.  Due to the patient's age and history and extensive work-up was entertained with negative EKG, normal labs, high-sensitivity troponin 26 without EKG changes or active chest pain and with relatively recent normal CT angiogram I do not feel further cardiac work-up is indicated.  Her head CT is normal.  Her blood pressure improved as did her symptoms without significant intervention.  She received Tylenol for headache.  Her exam remains unchanged to improved.  Patient appears to have a mild exacerbation of her chronic hypertension without evidence of endorgan damage or other acute life-threatening process.  She appears stable to continue with her recent change in blood pressure medications (HCTZ increased to twice daily just yesterday) and close outpatient clinic follow-up.  We discussed the indications for emergency department return and follow-up.  Stable for discharge.      I have reviewed the nursing notes.    I have reviewed the findings, diagnosis, plan and need for follow up with the patient.    New Prescriptions    No medications on file       Final diagnoses:   Primary hypertension       IJuliet am serving as a trained medical scribe to document services personally performed by Jona Trujillo MD, based on the provider's statements to me.      IJona MD, was physically present and have reviewed and verified the accuracy of this note documented by Juliet Torres.     Jona Trujillo MD  1/29/2022   [facility]  [facility] EMERGENCY DEPARTMENT     Jona Trujillo MD  01/29/22 1538

## 2022-01-30 LAB
ATRIAL RATE - MUSE: 66 BPM
DIASTOLIC BLOOD PRESSURE - MUSE: NORMAL MMHG
INTERPRETATION ECG - MUSE: NORMAL
P AXIS - MUSE: 16 DEGREES
PR INTERVAL - MUSE: 176 MS
QRS DURATION - MUSE: 80 MS
QT - MUSE: 426 MS
QTC - MUSE: 446 MS
R AXIS - MUSE: 37 DEGREES
SYSTOLIC BLOOD PRESSURE - MUSE: NORMAL MMHG
T AXIS - MUSE: 32 DEGREES
VENTRICULAR RATE- MUSE: 66 BPM

## 2024-04-25 ENCOUNTER — APPOINTMENT (OUTPATIENT)
Dept: CT IMAGING | Facility: CLINIC | Age: 64
End: 2024-04-25
Attending: EMERGENCY MEDICINE
Payer: COMMERCIAL

## 2024-04-25 ENCOUNTER — HOSPITAL ENCOUNTER (EMERGENCY)
Facility: CLINIC | Age: 64
Discharge: HOME | End: 2024-04-25
Attending: EMERGENCY MEDICINE | Admitting: EMERGENCY MEDICINE
Payer: COMMERCIAL

## 2024-04-25 ENCOUNTER — APPOINTMENT (OUTPATIENT)
Dept: MRI IMAGING | Facility: CLINIC | Age: 64
End: 2024-04-25
Attending: EMERGENCY MEDICINE
Payer: COMMERCIAL

## 2024-04-25 VITALS
OXYGEN SATURATION: 100 % | DIASTOLIC BLOOD PRESSURE: 72 MMHG | SYSTOLIC BLOOD PRESSURE: 180 MMHG | TEMPERATURE: 97.7 F | HEART RATE: 74 BPM | RESPIRATION RATE: 18 BRPM

## 2024-04-25 DIAGNOSIS — I65.21 STENOSIS OF RIGHT CAROTID ARTERY: ICD-10-CM

## 2024-04-25 DIAGNOSIS — R07.9 CHEST PAIN, UNSPECIFIED TYPE: ICD-10-CM

## 2024-04-25 LAB
ANION GAP SERPL CALCULATED.3IONS-SCNC: 11 MMOL/L (ref 7–15)
BASOPHILS # BLD AUTO: 0 10E3/UL (ref 0–0.2)
BASOPHILS NFR BLD AUTO: 1 %
BUN SERPL-MCNC: 10.1 MG/DL (ref 8–23)
CALCIUM SERPL-MCNC: 9.1 MG/DL (ref 8.8–10.2)
CHLORIDE SERPL-SCNC: 105 MMOL/L (ref 98–107)
CREAT SERPL-MCNC: 0.82 MG/DL (ref 0.51–0.95)
D DIMER PPP FEU-MCNC: 0.51 UG/ML FEU (ref 0–0.5)
DEPRECATED HCO3 PLAS-SCNC: 25 MMOL/L (ref 22–29)
EGFRCR SERPLBLD CKD-EPI 2021: 80 ML/MIN/1.73M2
EOSINOPHIL # BLD AUTO: 0.2 10E3/UL (ref 0–0.7)
EOSINOPHIL NFR BLD AUTO: 2 %
ERYTHROCYTE [DISTWIDTH] IN BLOOD BY AUTOMATED COUNT: 14.9 % (ref 10–15)
FLUAV RNA SPEC QL NAA+PROBE: NEGATIVE
FLUBV RNA RESP QL NAA+PROBE: NEGATIVE
GLUCOSE SERPL-MCNC: 89 MG/DL (ref 70–99)
HCT VFR BLD AUTO: 34.7 % (ref 35–47)
HGB BLD-MCNC: 10.9 G/DL (ref 11.7–15.7)
HOLD SPECIMEN: NORMAL
HOLD SPECIMEN: NORMAL
IMM GRANULOCYTES # BLD: 0 10E3/UL
IMM GRANULOCYTES NFR BLD: 0 %
LYMPHOCYTES # BLD AUTO: 3 10E3/UL (ref 0.8–5.3)
LYMPHOCYTES NFR BLD AUTO: 48 %
MCH RBC QN AUTO: 25.6 PG (ref 26.5–33)
MCHC RBC AUTO-ENTMCNC: 31.4 G/DL (ref 31.5–36.5)
MCV RBC AUTO: 82 FL (ref 78–100)
MONOCYTES # BLD AUTO: 0.3 10E3/UL (ref 0–1.3)
MONOCYTES NFR BLD AUTO: 5 %
NEUTROPHILS # BLD AUTO: 2.8 10E3/UL (ref 1.6–8.3)
NEUTROPHILS NFR BLD AUTO: 44 %
NRBC # BLD AUTO: 0 10E3/UL
NRBC BLD AUTO-RTO: 0 /100
NT-PROBNP SERPL-MCNC: 373 PG/ML (ref 0–900)
PLATELET # BLD AUTO: 227 10E3/UL (ref 150–450)
POTASSIUM SERPL-SCNC: 3.9 MMOL/L (ref 3.4–5.3)
RBC # BLD AUTO: 4.26 10E6/UL (ref 3.8–5.2)
RSV RNA SPEC NAA+PROBE: NEGATIVE
SARS-COV-2 RNA RESP QL NAA+PROBE: NEGATIVE
SODIUM SERPL-SCNC: 141 MMOL/L (ref 135–145)
TROPONIN T SERPL HS-MCNC: 9 NG/L
TROPONIN T SERPL HS-MCNC: 9 NG/L
WBC # BLD AUTO: 6.3 10E3/UL (ref 4–11)

## 2024-04-25 PROCEDURE — 36415 COLL VENOUS BLD VENIPUNCTURE: CPT | Performed by: EMERGENCY MEDICINE

## 2024-04-25 PROCEDURE — 250N000011 HC RX IP 250 OP 636: Performed by: EMERGENCY MEDICINE

## 2024-04-25 PROCEDURE — 96374 THER/PROPH/DIAG INJ IV PUSH: CPT | Mod: 59

## 2024-04-25 PROCEDURE — 85379 FIBRIN DEGRADATION QUANT: CPT

## 2024-04-25 PROCEDURE — A9585 GADOBUTROL INJECTION: HCPCS | Performed by: EMERGENCY MEDICINE

## 2024-04-25 PROCEDURE — 96375 TX/PRO/DX INJ NEW DRUG ADDON: CPT

## 2024-04-25 PROCEDURE — 84484 ASSAY OF TROPONIN QUANT: CPT

## 2024-04-25 PROCEDURE — 255N000002 HC RX 255 OP 636: Performed by: EMERGENCY MEDICINE

## 2024-04-25 PROCEDURE — 250N000011 HC RX IP 250 OP 636: Mod: JZ

## 2024-04-25 PROCEDURE — 80048 BASIC METABOLIC PNL TOTAL CA: CPT | Performed by: EMERGENCY MEDICINE

## 2024-04-25 PROCEDURE — 83880 ASSAY OF NATRIURETIC PEPTIDE: CPT

## 2024-04-25 PROCEDURE — 70544 MR ANGIOGRAPHY HEAD W/O DYE: CPT | Mod: XU

## 2024-04-25 PROCEDURE — 84484 ASSAY OF TROPONIN QUANT: CPT | Performed by: EMERGENCY MEDICINE

## 2024-04-25 PROCEDURE — 87637 SARSCOV2&INF A&B&RSV AMP PRB: CPT

## 2024-04-25 PROCEDURE — 70553 MRI BRAIN STEM W/O & W/DYE: CPT

## 2024-04-25 PROCEDURE — 70546 MR ANGIOGRAPH HEAD W/O&W/DYE: CPT

## 2024-04-25 PROCEDURE — 96361 HYDRATE IV INFUSION ADD-ON: CPT

## 2024-04-25 PROCEDURE — 93005 ELECTROCARDIOGRAM TRACING: CPT | Mod: RTG

## 2024-04-25 PROCEDURE — 258N000003 HC RX IP 258 OP 636

## 2024-04-25 PROCEDURE — 250N000009 HC RX 250: Performed by: EMERGENCY MEDICINE

## 2024-04-25 PROCEDURE — 70549 MR ANGIOGRAPH NECK W/O&W/DYE: CPT

## 2024-04-25 PROCEDURE — 85025 COMPLETE CBC W/AUTO DIFF WBC: CPT | Performed by: EMERGENCY MEDICINE

## 2024-04-25 PROCEDURE — 36415 COLL VENOUS BLD VENIPUNCTURE: CPT

## 2024-04-25 PROCEDURE — 71260 CT THORAX DX C+: CPT

## 2024-04-25 PROCEDURE — 99285 EMERGENCY DEPT VISIT HI MDM: CPT | Mod: 25

## 2024-04-25 RX ORDER — IOPAMIDOL 755 MG/ML
72 INJECTION, SOLUTION INTRAVASCULAR ONCE
Status: COMPLETED | OUTPATIENT
Start: 2024-04-25 | End: 2024-04-25

## 2024-04-25 RX ORDER — METOCLOPRAMIDE 5 MG/1
5 TABLET ORAL
Qty: 10 TABLET | Refills: 0 | Status: SHIPPED | OUTPATIENT
Start: 2024-04-25 | End: 2024-05-05

## 2024-04-25 RX ORDER — METOCLOPRAMIDE HYDROCHLORIDE 5 MG/ML
5 INJECTION INTRAMUSCULAR; INTRAVENOUS ONCE
Status: COMPLETED | OUTPATIENT
Start: 2024-04-25 | End: 2024-04-25

## 2024-04-25 RX ORDER — DIPHENHYDRAMINE HYDROCHLORIDE 50 MG/ML
25 INJECTION INTRAMUSCULAR; INTRAVENOUS ONCE
Status: COMPLETED | OUTPATIENT
Start: 2024-04-25 | End: 2024-04-25

## 2024-04-25 RX ORDER — GADOBUTROL 604.72 MG/ML
10 INJECTION INTRAVENOUS ONCE
Status: COMPLETED | OUTPATIENT
Start: 2024-04-25 | End: 2024-04-25

## 2024-04-25 RX ADMIN — SODIUM CHLORIDE 1000 ML: 9 INJECTION, SOLUTION INTRAVENOUS at 22:00

## 2024-04-25 RX ADMIN — METOCLOPRAMIDE 5 MG: 5 INJECTION, SOLUTION INTRAMUSCULAR; INTRAVENOUS at 19:27

## 2024-04-25 RX ADMIN — IOPAMIDOL 72 ML: 755 INJECTION, SOLUTION INTRAVENOUS at 21:13

## 2024-04-25 RX ADMIN — GADOBUTROL 10 ML: 604.72 INJECTION INTRAVENOUS at 20:44

## 2024-04-25 RX ADMIN — DIPHENHYDRAMINE HYDROCHLORIDE 25 MG: 50 INJECTION, SOLUTION INTRAMUSCULAR; INTRAVENOUS at 19:28

## 2024-04-25 RX ADMIN — SODIUM CHLORIDE 80 ML: 9 INJECTION, SOLUTION INTRAVENOUS at 21:13

## 2024-04-25 ASSESSMENT — ACTIVITIES OF DAILY LIVING (ADL)
ADLS_ACUITY_SCORE: 36

## 2024-04-25 ASSESSMENT — COLUMBIA-SUICIDE SEVERITY RATING SCALE - C-SSRS
6. HAVE YOU EVER DONE ANYTHING, STARTED TO DO ANYTHING, OR PREPARED TO DO ANYTHING TO END YOUR LIFE?: NO
1. IN THE PAST MONTH, HAVE YOU WISHED YOU WERE DEAD OR WISHED YOU COULD GO TO SLEEP AND NOT WAKE UP?: NO
2. HAVE YOU ACTUALLY HAD ANY THOUGHTS OF KILLING YOURSELF IN THE PAST MONTH?: NO

## 2024-04-25 NOTE — ED PROVIDER NOTES
Emergency Department Attending Supervision Note  4/25/2024  6:39 PM      I evaluated this patient in conjunction with Norman Joshi PA-C, please see primary note for full details      Briefly, the patient presented with headache and vertigo as well as left-sided chest pain with radiation to back, exertional in nature improved with rest.      On my exam, nontoxic appearance, moves extremities x 4, RRR, 2+ bilateral radial pulses, abdomen nontender    Results:  CT Aortic Survey w Contrast   Final Result   IMPRESSION:   No evidence of acute aortic syndrome or other explanation for symptoms.         MRV Brain wo & w Contrast   Final Result   IMPRESSION:   HEAD MRI:   No acute intracranial abnormality. No evidence of acute infarct, hemorrhage, or mass.      HEAD MRA:   No aneurysm, high flow AVM or significant stenosis identified.      NECK MRA:   Approximately 50% moderate to long segment stenosis of the proximal and mid right cervical internal carotid artery.      HEAD MRV:   No dural venous sinus thrombosis or significant stenosis.         MR Brain w/o & w Contrast   Final Result   IMPRESSION:   HEAD MRI:   No acute intracranial abnormality. No evidence of acute infarct, hemorrhage, or mass.      HEAD MRA:   No aneurysm, high flow AVM or significant stenosis identified.      NECK MRA:   Approximately 50% moderate to long segment stenosis of the proximal and mid right cervical internal carotid artery.      HEAD MRV:   No dural venous sinus thrombosis or significant stenosis.         MRA Neck (Carotids) wo & w Contrast   Final Result   IMPRESSION:   HEAD MRI:   No acute intracranial abnormality. No evidence of acute infarct, hemorrhage, or mass.      HEAD MRA:   No aneurysm, high flow AVM or significant stenosis identified.      NECK MRA:   Approximately 50% moderate to long segment stenosis of the proximal and mid right cervical internal carotid artery.      HEAD MRV:   No dural venous sinus thrombosis or significant  stenosis.         MRA Brain ([place] wo Contrast   Final Result   IMPRESSION:   HEAD MRI:   No acute intracranial abnormality. No evidence of acute infarct, hemorrhage, or mass.      HEAD MRA:   No aneurysm, high flow AVM or significant stenosis identified.      NECK MRA:   Approximately 50% moderate to long segment stenosis of the proximal and mid right cervical internal carotid artery.      HEAD MRV:   No dural venous sinus thrombosis or significant stenosis.         Exercise Stress Echocardiogram    (Results Pending)         Labs Ordered and Resulted from Time of ED Arrival to Time of ED Departure   CBC WITH PLATELETS AND DIFFERENTIAL - Abnormal       Result Value    WBC Count 6.3      RBC Count 4.26      Hemoglobin 10.9 (*)     Hematocrit 34.7 (*)     MCV 82      MCH 25.6 (*)     MCHC 31.4 (*)     RDW 14.9      Platelet Count 227      % Neutrophils 44      % Lymphocytes 48      % Monocytes 5      % Eosinophils 2      % Basophils 1      % Immature Granulocytes 0      NRBCs per 100 WBC 0      Absolute Neutrophils 2.8      Absolute Lymphocytes 3.0      Absolute Monocytes 0.3      Absolute Eosinophils 0.2      Absolute Basophils 0.0      Absolute Immature Granulocytes 0.0      Absolute NRBCs 0.0     D DIMER QUANTITATIVE - Abnormal    D-Dimer Quantitative 0.51 (*)    BASIC METABOLIC PANEL - Normal    Sodium 141      Potassium 3.9      Chloride 105      Carbon Dioxide (CO2) 25      Anion Gap 11      Urea Nitrogen 10.1      Creatinine 0.82      GFR Estimate 80      Calcium 9.1      Glucose 89     TROPONIN T, HIGH SENSITIVITY - Normal    Troponin T, High Sensitivity 9     INFLUENZA A/B, RSV, & SARS-COV2 PCR - Normal    Influenza A PCR Negative      Influenza B PCR Negative      RSV PCR Negative      SARS CoV2 PCR Negative     NT PROBNP INPATIENT - Normal    N terminal Pro BNP Inpatient 373     TROPONIN T, HIGH SENSITIVITY - Normal    Troponin T, High Sensitivity 9             MDM    63-year-old female  presenting with chest pain and headache.  Imaging as noted above without acute traumatic abnormality.  Chronic findings identified including carotid stenosis.  Given patient's exertional shortness of breath and chest discomfort, stress test ordered upon discharge.  Likely peripheral vertigo, migraine and chest pain NOS.  Clinic follow-up and ED return precautions given.    Diagnosis    ICD-10-CM    1. Stenosis of right carotid artery  I65.21       2. Chest pain, unspecified type  R07.9 Follow-Up with Cardiology     Exercise Stress Echocardiogram            MD Ced Tinajero Christopher E, MD  04/25/24 1554

## 2024-04-25 NOTE — ED PROVIDER NOTES
History     Chief Complaint:  Chest Pain, Shortness of Breath, and Headache       HPI   Hallie Huitron is a 63 year old female with history of hypertension and recent cataract surgery who presents with complaints of chest pain and headache.  Patient states headache started 3 days ago, progressively worsening, describes as pressure, worst headache of her life.  She also endorses room spinning dizziness, worsens with head movements.  Patient states chest pain started this morning while she was walking up a set of stairs into her bedroom.  She describes the chest pain as left side, pressure, radiates to her back, worse with activity, relieved with rest.  She also endorses intermittent palpitations, shortness of breath, and nausea today.  She denies fever and chills, abdominal pain, vomiting, diarrhea or constipation, hematuria or dysuria.Staffed with Dr. Martin  She also denies recent traumatic injury.      Independent Historian:   Daughter - They report her mother has been having a headache on her left side since her recent left side cataract surgery.    Review of External Notes:   4/12/2024 ophthalmology office visit note, postop check.  No evidence of postoperative infection/inflammation.  Recommend recheck in 1 year.          Medications:    metoclopramide (REGLAN) 5 MG tablet  acetaminophen (TYLENOL) 650 MG CR tablet  ATORVASTATIN CALCIUM PO  carvedilol (COREG) 25 MG tablet  Celecoxib (CELEBREX PO)  cod liver oil CAPS capsule  CYCLOBENZAPRINE HCL PO  HYDROCHLOROTHIAZIDE PO  RIFAMPIN PO        Past Medical History:    Past Medical History:   Diagnosis Date    Cardiac angina (H)     Hypertension        Past Surgical History:    Past Surgical History:   Procedure Laterality Date    CHOLECYSTECTOMY      HYSTERECTOMY      ORTHOPEDIC SURGERY      knee replacement Right        Physical Exam   Patient Vitals for the past 24 hrs:   BP Temp Temp src Pulse Resp SpO2   04/25/24 2200 (!) 180/72 -- -- -- 18 --   04/25/24 2156 -- --  -- 74 -- 100 %   04/25/24 1945 (!) 179/94 -- -- 72 -- 100 %   04/25/24 1915 (!) 172/90 -- -- 73 -- 100 %   04/25/24 1827 (!) 178/101 -- -- 75 -- 100 %   04/25/24 1812 (!) 174/94 -- -- 71 -- 100 %   04/25/24 1802 (!) 180/92 -- -- 71 (!) 41 100 %   04/25/24 1729 (!) 190/84 97.7  F (36.5  C) Temporal 83 18 100 %        Physical Exam    Physical Exam:  GENERAL: Warm, dry, alert, no increased work of breathing, appears moderately uncomfortable while lying on gurney.  HEENT: PERRLA, EOMs intact without pain, no nystagmus, no conjunctival injection, oropharynx clear without erythema or exudates  NECK: No JVD, supple without lymphadenopathy.  No stiffness or restricted range of motion  HEART: Regular rate and rhythm, no murmur or rubs  LUNGS: CTAB, moving air well.  No crackles or wheezes are heard.  ABD: Soft, nontender, nondistended, no guarding, with good bowel sounds heard.  BACK: No CVAT, no obvious deformities  EXTREMITIES: Moves all extremities without difficulty, no calf tenderness or peripheral edema.  SKIN: Warm and dry without rash or lesions.  NEUROLOGICAL: No focal deficits.  CN II-XII intact.  PSYCH: Appropriate mood and affect.     Emergency Department Course   ECG  ECG results from 04/25/24   EKG 12 lead     Value    Systolic Blood Pressure     Diastolic Blood Pressure     Ventricular Rate 75    Atrial Rate 75    IN Interval 220    QRS Duration 80        QTc 448    P Axis 53    R AXIS 30    T Axis 30    Interpretation ECG      Sinus rhythm with 1st degree A-V block  Otherwise normal ECG  When compared with ECG of 29-JAN-2022 15:00,  IN interval has increased         Imaging:  CT Aortic Survey w Contrast   Final Result   IMPRESSION:   No evidence of acute aortic syndrome or other explanation for symptoms.         MRV Brain wo & w Contrast   Final Result   IMPRESSION:   HEAD MRI:   No acute intracranial abnormality. No evidence of acute infarct, hemorrhage, or mass.      HEAD MRA:   No aneurysm, high  flow AVM or significant stenosis identified.      NECK MRA:   Approximately 50% moderate to long segment stenosis of the proximal and mid right cervical internal carotid artery.      HEAD MRV:   No dural venous sinus thrombosis or significant stenosis.         MR Brain w/o & w Contrast   Final Result   IMPRESSION:   HEAD MRI:   No acute intracranial abnormality. No evidence of acute infarct, hemorrhage, or mass.      HEAD MRA:   No aneurysm, high flow AVM or significant stenosis identified.      NECK MRA:   Approximately 50% moderate to long segment stenosis of the proximal and mid right cervical internal carotid artery.      HEAD MRV:   No dural venous sinus thrombosis or significant stenosis.         MRA Neck (Carotids) wo & w Contrast   Final Result   IMPRESSION:   HEAD MRI:   No acute intracranial abnormality. No evidence of acute infarct, hemorrhage, or mass.      HEAD MRA:   No aneurysm, high flow AVM or significant stenosis identified.      NECK MRA:   Approximately 50% moderate to long segment stenosis of the proximal and mid right cervical internal carotid artery.      HEAD MRV:   No dural venous sinus thrombosis or significant stenosis.         MRA Brain ([place]) wo Contrast   Final Result   IMPRESSION:   HEAD MRI:   No acute intracranial abnormality. No evidence of acute infarct, hemorrhage, or mass.      HEAD MRA:   No aneurysm, high flow AVM or significant stenosis identified.      NECK MRA:   Approximately 50% moderate to long segment stenosis of the proximal and mid right cervical internal carotid artery.      HEAD MRV:   No dural venous sinus thrombosis or significant stenosis.         Exercise Stress Echocardiogram    (Results Pending)          Laboratory:  Labs Ordered and Resulted from Time of ED Arrival to Time of ED Departure   CBC WITH PLATELETS AND DIFFERENTIAL - Abnormal       Result Value    WBC Count 6.3      RBC Count 4.26      Hemoglobin 10.9 (*)     Hematocrit 34.7 (*)     MCV  82      MCH 25.6 (*)     MCHC 31.4 (*)     RDW 14.9      Platelet Count 227      % Neutrophils 44      % Lymphocytes 48      % Monocytes 5      % Eosinophils 2      % Basophils 1      % Immature Granulocytes 0      NRBCs per 100 WBC 0      Absolute Neutrophils 2.8      Absolute Lymphocytes 3.0      Absolute Monocytes 0.3      Absolute Eosinophils 0.2      Absolute Basophils 0.0      Absolute Immature Granulocytes 0.0      Absolute NRBCs 0.0     D DIMER QUANTITATIVE - Abnormal    D-Dimer Quantitative 0.51 (*)    BASIC METABOLIC PANEL - Normal    Sodium 141      Potassium 3.9      Chloride 105      Carbon Dioxide (CO2) 25      Anion Gap 11      Urea Nitrogen 10.1      Creatinine 0.82      GFR Estimate 80      Calcium 9.1      Glucose 89     TROPONIN T, HIGH SENSITIVITY - Normal    Troponin T, High Sensitivity 9     INFLUENZA A/B, RSV, & SARS-COV2 PCR - Normal    Influenza A PCR Negative      Influenza B PCR Negative      RSV PCR Negative      SARS CoV2 PCR Negative     NT PROBNP INPATIENT - Normal    N terminal Pro BNP Inpatient 373     TROPONIN T, HIGH SENSITIVITY - Normal    Troponin T, High Sensitivity 9          Procedures:  None    Emergency Department Course & Assessments:    Interventions:  Medications   metoclopramide (REGLAN) injection 5 mg (5 mg Intravenous $Given 4/25/24 1927)   diphenhydrAMINE (BENADRYL) injection 25 mg (25 mg Intravenous $Given 4/25/24 1928)   iopamidol (ISOVUE-370) solution 72 mL (72 mLs Intravenous $Given 4/25/24 2113)   Saline Flush (80 mLs Intravenous $Given 4/25/24 2113)   gadobutrol (GADAVIST) injection 10 mL (10 mLs Intravenous $Given 4/25/24 2044)   sodium chloride (PF) 0.9% PF flush 100 mL (100 mLs Intravenous $Given 4/25/24 2044)   sodium chloride 0.9% BOLUS 1,000 mL (0 mLs Intravenous Stopped 4/25/24 2259)            Independent Interpretation (X-rays, CTs, rhythm strip):  MR brain, no ICH, no space occupying lesion  CT aortic survey, no dissection    Consultations/Discussion  of Management or Tests:  Staffed with Dr. Coughlin   ED Course as of 04/25/24 2309   Thu Apr 25, 2024 1815 I evaluated and examined the patient   2121 Brain and neck MRA, brain MR, and brain MRV found no acute pathology to explain symptoms.   2125 I reevaluated the patient, she reports headache is decreased significantly, chest pain decreased.  She reports she did have some shortness of breath when she walks to the bathroom.       Social Determinants of Health affecting care:   None    Disposition:  The patient was discharged.     Impression & Plan    CMS Diagnoses: None         Medical Decision Making:  Patient is a 63-year-old female with history of hypertension and recent cataract surgery who presents with complaint of headache and chest pain.  Differential includes but is not limited to CVA, dural venous sinus thrombosis, ACS, aortic dissection, and migraine among many others.  Vital signs were significant for hypertension at presentation with blood pressure 178/101 otherwise she was not tachycardic nor febrile.  Physical exam was largely unremarkable, there were no focal neurological deficits.      Her workup was broad today given her multiple complaints.  Her ECG was not consistent with an ACS pattern and her first and second troponin were negative, ACS is not likely today.  Pulmonary embolism was also considered given her chest pain and ROCA however she is not tachycardic and not hypoxic and has no history of clotting disorder.  I did elect to obtain a D-dimer as I was not able to exclude PE via PERC due to her age however D-dimer was negative when adjusted for age.  Pulmonary embolism was effectively ruled out.  There was no leukocytosis, she was mildly anemic however this was consistent with previous findings and she is at baseline.  There were no metabolic derangements.  Given her combination of chest pain and headache I was also concerned for either an aortic aneurysm or vertebral dissection.  MRA of the  brain was negative for acute pathology.  MRA of the neck did demonstrate a 50% moderate to long segment stenosis of the proximal and mid right cervical internal carotid artery.  I do not believe this stenosis is responsible for today's symptoms, patient is best suited to follow-up with her primary care physician regarding this incidental finding for medical management of atherosclerotic disease.  BNP was negative, there were no rales with lung sounds, there is no peripheral edema, acute heart failure not likely.  Viral swab was negative for influenza, RSV, and COVID-19.    Patient was given metoclopramide and Benadryl for her headache.  She reported her symptoms improved dramatically with medication.  Today's collection of symptoms are consistent with a migraine headache.  Unfortunately, I do not have a good explanation for her chest pain and shortness of breath, today.  Given her atherosclerotic disease as seen in her carotid stenosis, I recommended she follow-up with a cardiac stress test.  She and her family agreed with this, order was placed.  Viral testing was negative today however we do only test for limited viruses, she most certainly could have a low-lying viral infection explaining her constellation of symptoms.  I discussed all findings with the patient and her family at bedside.  I strongly recommended she follow-up with her primary care physician regarding the carotid stenosis.  They did request a prescription for Reglan given effectiveness in treating her headache and dizziness, prescription placed at her pharmacy.  All questions were answered.  The patient and her family state they understand and agree with all plans discussed.  The patient was discharged in stable condition in the care of her daughter.  All vital signs and nurses notes were reviewed.      Diagnosis:    ICD-10-CM    1. Stenosis of right carotid artery  I65.21       2. Chest pain, unspecified type  R07.9 Follow-Up with Cardiology      Exercise Stress Echocardiogram           Discharge Medications:  New Prescriptions    METOCLOPRAMIDE (REGLAN) 5 MG TABLET    Take 1 tablet (5 mg) by mouth once as needed (As needed for headache)          Norman Joshi PA-C  4/25/2024   Dr. Alvaro Coughlin MD, Attending     Norman Joshi PA-C  04/25/24 5388

## 2024-04-25 NOTE — ED TRIAGE NOTES
Pt reports whole head headache for approx 3 days along with palpitations, chest pain and shortness of breath. Pt reports cardiac history and previous angiogram in 2016.      Triage Assessment (Adult)       Row Name 04/25/24 1735          Triage Assessment    Airway WDL WDL        Respiratory WDL    Respiratory WDL WDL        Skin Circulation/Temperature WDL    Skin Circulation/Temperature WDL WDL        Cardiac WDL    Cardiac WDL X        Peripheral/Neurovascular WDL    Peripheral Neurovascular WDL WDL        Cognitive/Neuro/Behavioral WDL    Cognitive/Neuro/Behavioral WDL X     Level of Consciousness alert     Arousal Level opens eyes spontaneously     Orientation oriented x 4     Speech spontaneous;logical     Mood/Behavior calm;cooperative        Rahul Coma Scale    Best Eye Response 4-->(E4) spontaneous     Best Motor Response 6-->(M6) obeys commands     Best Verbal Response 5-->(V5) oriented     Rahul Coma Scale Score 15

## 2024-04-26 LAB
ATRIAL RATE - MUSE: 75 BPM
DIASTOLIC BLOOD PRESSURE - MUSE: NORMAL MMHG
INTERPRETATION ECG - MUSE: NORMAL
P AXIS - MUSE: 53 DEGREES
PR INTERVAL - MUSE: 220 MS
QRS DURATION - MUSE: 80 MS
QT - MUSE: 402 MS
QTC - MUSE: 448 MS
R AXIS - MUSE: 30 DEGREES
SYSTOLIC BLOOD PRESSURE - MUSE: NORMAL MMHG
T AXIS - MUSE: 30 DEGREES
VENTRICULAR RATE- MUSE: 75 BPM

## 2024-05-10 ENCOUNTER — HOSPITAL ENCOUNTER (EMERGENCY)
Facility: CLINIC | Age: 64
Discharge: HOME | End: 2024-05-10
Attending: SOCIAL WORKER | Admitting: SOCIAL WORKER
Payer: COMMERCIAL

## 2024-05-10 ENCOUNTER — APPOINTMENT (OUTPATIENT)
Dept: MRI IMAGING | Facility: CLINIC | Age: 64
End: 2024-05-10
Attending: SOCIAL WORKER
Payer: COMMERCIAL

## 2024-05-10 VITALS
WEIGHT: 215 LBS | BODY MASS INDEX: 34.55 KG/M2 | SYSTOLIC BLOOD PRESSURE: 185 MMHG | HEIGHT: 66 IN | DIASTOLIC BLOOD PRESSURE: 95 MMHG | RESPIRATION RATE: 20 BRPM | OXYGEN SATURATION: 98 % | TEMPERATURE: 98.2 F | HEART RATE: 81 BPM

## 2024-05-10 DIAGNOSIS — R51.9 HEADACHE, UNSPECIFIED HEADACHE TYPE: ICD-10-CM

## 2024-05-10 LAB
ALBUMIN SERPL BCG-MCNC: 4.4 G/DL (ref 3.5–5.2)
ALBUMIN UR-MCNC: NEGATIVE MG/DL
ALP SERPL-CCNC: 77 U/L (ref 40–150)
ALT SERPL W P-5'-P-CCNC: 17 U/L (ref 0–50)
ANION GAP SERPL CALCULATED.3IONS-SCNC: 12 MMOL/L (ref 7–15)
APPEARANCE UR: CLEAR
AST SERPL W P-5'-P-CCNC: 26 U/L (ref 0–45)
BASOPHILS # BLD AUTO: 0 10E3/UL (ref 0–0.2)
BASOPHILS NFR BLD AUTO: 1 %
BILIRUB SERPL-MCNC: 0.4 MG/DL
BILIRUB UR QL STRIP: NEGATIVE
BUN SERPL-MCNC: 10 MG/DL (ref 8–23)
CALCIUM SERPL-MCNC: 9.8 MG/DL (ref 8.8–10.2)
CHLORIDE SERPL-SCNC: 102 MMOL/L (ref 98–107)
COLOR UR AUTO: NORMAL
CREAT SERPL-MCNC: 0.82 MG/DL (ref 0.51–0.95)
CRP SERPL-MCNC: <3 MG/L
DEPRECATED HCO3 PLAS-SCNC: 26 MMOL/L (ref 22–29)
EGFRCR SERPLBLD CKD-EPI 2021: 80 ML/MIN/1.73M2
EOSINOPHIL # BLD AUTO: 0.2 10E3/UL (ref 0–0.7)
EOSINOPHIL NFR BLD AUTO: 3 %
ERYTHROCYTE [DISTWIDTH] IN BLOOD BY AUTOMATED COUNT: 14.8 % (ref 10–15)
ERYTHROCYTE [SEDIMENTATION RATE] IN BLOOD BY WESTERGREN METHOD: 31 MM/HR (ref 0–30)
GLUCOSE SERPL-MCNC: 88 MG/DL (ref 70–99)
GLUCOSE UR STRIP-MCNC: NEGATIVE MG/DL
HCT VFR BLD AUTO: 40.3 % (ref 35–47)
HGB BLD-MCNC: 12.5 G/DL (ref 11.7–15.7)
HGB UR QL STRIP: NEGATIVE
IMM GRANULOCYTES # BLD: 0 10E3/UL
IMM GRANULOCYTES NFR BLD: 0 %
KETONES UR STRIP-MCNC: NEGATIVE MG/DL
LEUKOCYTE ESTERASE UR QL STRIP: NEGATIVE
LYMPHOCYTES # BLD AUTO: 2.6 10E3/UL (ref 0.8–5.3)
LYMPHOCYTES NFR BLD AUTO: 47 %
MCH RBC QN AUTO: 25.4 PG (ref 26.5–33)
MCHC RBC AUTO-ENTMCNC: 31 G/DL (ref 31.5–36.5)
MCV RBC AUTO: 82 FL (ref 78–100)
MONOCYTES # BLD AUTO: 0.3 10E3/UL (ref 0–1.3)
MONOCYTES NFR BLD AUTO: 5 %
NEUTROPHILS # BLD AUTO: 2.5 10E3/UL (ref 1.6–8.3)
NEUTROPHILS NFR BLD AUTO: 44 %
NITRATE UR QL: NEGATIVE
NRBC # BLD AUTO: 0 10E3/UL
NRBC BLD AUTO-RTO: 0 /100
PH UR STRIP: 5.5 [PH] (ref 5–7)
PLATELET # BLD AUTO: 247 10E3/UL (ref 150–450)
POTASSIUM SERPL-SCNC: 3.9 MMOL/L (ref 3.4–5.3)
PROT SERPL-MCNC: 7.9 G/DL (ref 6.4–8.3)
RBC # BLD AUTO: 4.92 10E6/UL (ref 3.8–5.2)
SODIUM SERPL-SCNC: 140 MMOL/L (ref 135–145)
SP GR UR STRIP: 1.01 (ref 1–1.03)
TSH SERPL DL<=0.005 MIU/L-ACNC: 2.96 UIU/ML (ref 0.3–4.2)
UROBILINOGEN UR STRIP-MCNC: NORMAL MG/DL
WBC # BLD AUTO: 5.6 10E3/UL (ref 4–11)

## 2024-05-10 PROCEDURE — 70553 MRI BRAIN STEM W/O & W/DYE: CPT

## 2024-05-10 PROCEDURE — 80053 COMPREHEN METABOLIC PANEL: CPT | Performed by: SOCIAL WORKER

## 2024-05-10 PROCEDURE — 255N000002 HC RX 255 OP 636: Performed by: SOCIAL WORKER

## 2024-05-10 PROCEDURE — 99285 EMERGENCY DEPT VISIT HI MDM: CPT | Mod: 25

## 2024-05-10 PROCEDURE — 81003 URINALYSIS AUTO W/O SCOPE: CPT | Performed by: SOCIAL WORKER

## 2024-05-10 PROCEDURE — 85025 COMPLETE CBC W/AUTO DIFF WBC: CPT | Performed by: SOCIAL WORKER

## 2024-05-10 PROCEDURE — 84443 ASSAY THYROID STIM HORMONE: CPT | Performed by: SOCIAL WORKER

## 2024-05-10 PROCEDURE — A9585 GADOBUTROL INJECTION: HCPCS | Performed by: SOCIAL WORKER

## 2024-05-10 PROCEDURE — 85652 RBC SED RATE AUTOMATED: CPT | Performed by: EMERGENCY MEDICINE

## 2024-05-10 PROCEDURE — 36415 COLL VENOUS BLD VENIPUNCTURE: CPT | Performed by: SOCIAL WORKER

## 2024-05-10 PROCEDURE — 70546 MR ANGIOGRAPH HEAD W/O&W/DYE: CPT | Mod: XU

## 2024-05-10 PROCEDURE — 86140 C-REACTIVE PROTEIN: CPT | Performed by: EMERGENCY MEDICINE

## 2024-05-10 PROCEDURE — 99222 1ST HOSP IP/OBS MODERATE 55: CPT | Performed by: PSYCHIATRY & NEUROLOGY

## 2024-05-10 RX ORDER — GADOBUTROL 604.72 MG/ML
10 INJECTION INTRAVENOUS ONCE
Status: COMPLETED | OUTPATIENT
Start: 2024-05-10 | End: 2024-05-10

## 2024-05-10 RX ADMIN — GADOBUTROL 10 ML: 604.72 INJECTION INTRAVENOUS at 21:30

## 2024-05-10 ASSESSMENT — ACTIVITIES OF DAILY LIVING (ADL)
ADLS_ACUITY_SCORE: 36

## 2024-05-10 ASSESSMENT — COLUMBIA-SUICIDE SEVERITY RATING SCALE - C-SSRS
2. HAVE YOU ACTUALLY HAD ANY THOUGHTS OF KILLING YOURSELF IN THE PAST MONTH?: NO
6. HAVE YOU EVER DONE ANYTHING, STARTED TO DO ANYTHING, OR PREPARED TO DO ANYTHING TO END YOUR LIFE?: NO
1. IN THE PAST MONTH, HAVE YOU WISHED YOU WERE DEAD OR WISHED YOU COULD GO TO SLEEP AND NOT WAKE UP?: NO

## 2024-05-10 NOTE — ED TRIAGE NOTES
Pt reports developing a HA 1 month ago that has become worse. Pt also notes blurry vision along with facial swelling.      Triage Assessment (Adult)       Row Name 05/10/24 1423          Triage Assessment    Airway WDL WDL        Respiratory WDL    Respiratory WDL X  SOB        Skin Circulation/Temperature WDL    Skin Circulation/Temperature WDL WDL        Cardiac WDL    Cardiac WDL WDL        Peripheral/Neurovascular WDL    Peripheral Neurovascular WDL WDL        Cognitive/Neuro/Behavioral WDL    Cognitive/Neuro/Behavioral WDL X  HA

## 2024-05-10 NOTE — ED PROVIDER NOTES
"  History     Chief Complaint:  Headache       HPI   Hallie Huitron is a 63 year old female who presents to the ED for facial swelling and headaches. For the past month, the patient has been experiencing headaches, along with facial swelling upon waking up in the morning, which then improves throughout the day.  She notes pressure in her face. The swelling is most prominent around her eyes. She also notes swelling near her ankles. The patient describes the headache as constant. She states nothing makes it better or worse. Over the month, she states she has not had any relief from the headache. Patient endorses blurry vision and fatigue. Patient denies fever, changes in chronic chest pain or hypertension, or frothy/foamy urine. She is eating and drinking fine. Spoke with patient's family friend Dr Barclay with stroke neurology, who referred her to ED and recommended MR w/ and w/o as well as MRV w/ and w/o.    Independent Historian:    Patient history was provided by the patient and supplemented by her daughter.    Review of External Notes:  I reviewed cardiology visit: normal stress echo 5/1/24 without inducible ischemia, reviewed MRA recently completed.     Allergies:  No Known Allergies     Relevant Medical History:  Hypertension   Anemia  Hyperlipidemia    Physical Exam   Patient Vitals for the past 24 hrs:   BP Temp Temp src Pulse Resp SpO2 Height Weight   05/10/24 1425 (!) 182/82 98.2  F (36.8  C) Temporal 83 20 99 % 1.676 m (5' 6\") 97.5 kg (215 lb)        Physical Exam  General: Overall stable and nontoxic appearing  HEENT: Conjunctivae clear, no scleral icterus, mucous membranes moist. Mild periorbital swelling. Extraocular movements in tact.   Neuro: Alert, conversant; no facial droop, no slurred speech; strength and sensation grossly intact and equal bilaterally in upper and lower extremities; gait is intact without ataxia   CV: Regular rate, regular rhythm, radial and DP pulses equal  Respiratory: No signs of " respiratory distress, lungs clear to auscultation bilaterally   Abdomen: Soft, without rigidity or rebound throughout  MSK: Trace ankle swelling bilaterally.     Emergency Department Course         Laboratory: Imaging:   Labs Ordered and Resulted from Time of ED Arrival to Time of ED Departure   CBC WITH PLATELETS AND DIFFERENTIAL - Abnormal       Result Value    WBC Count 5.6      RBC Count 4.92      Hemoglobin 12.5      Hematocrit 40.3      MCV 82      MCH 25.4 (*)     MCHC 31.0 (*)     RDW 14.8      Platelet Count 247      % Neutrophils 44      % Lymphocytes 47      % Monocytes 5      % Eosinophils 3      % Basophils 1      % Immature Granulocytes 0      NRBCs per 100 WBC 0      Absolute Neutrophils 2.5      Absolute Lymphocytes 2.6      Absolute Monocytes 0.3      Absolute Eosinophils 0.2      Absolute Basophils 0.0      Absolute Immature Granulocytes 0.0      Absolute NRBCs 0.0     ERYTHROCYTE SEDIMENTATION RATE AUTO - Abnormal    Erythrocyte Sedimentation Rate 31 (*)    COMPREHENSIVE METABOLIC PANEL - Normal    Sodium 140      Potassium 3.9      Carbon Dioxide (CO2) 26      Anion Gap 12      Urea Nitrogen 10.0      Creatinine 0.82      GFR Estimate 80      Calcium 9.8      Chloride 102      Glucose 88      Alkaline Phosphatase 77      AST 26      ALT 17      Protein Total 7.9      Albumin 4.4      Bilirubin Total 0.4     TSH WITH FREE T4 REFLEX - Normal    TSH 2.96     URINE MACROSCOPIC WITH REFLEX TO MICRO - Normal    Color Urine Straw      Appearance Urine Clear      Glucose Urine Negative      Bilirubin Urine Negative      Ketones Urine Negative      Specific Gravity Urine 1.010      Blood Urine Negative      pH Urine 5.5      Protein Albumin Urine Negative      Urobilinogen Urine Normal      Nitrite Urine Negative      Leukocyte Esterase Urine Negative     CRP INFLAMMATION - Normal    CRP Inflammation <3.00       MRV Brain wo & w Contrast   Final Result   IMPRESSION:   HEAD MRI:    1.  No acute infarct,  mass, mass effect, or hemorrhage.   2.  Mild atrophy.      HEAD MRV:    1.  No dural venous sinus thrombosis or significant stenosis.      MR Brain w/o & w Contrast   Final Result   IMPRESSION:   HEAD MRI:    1.  No acute infarct, mass, mass effect, or hemorrhage.   2.  Mild atrophy.      HEAD MRV:    1.  No dural venous sinus thrombosis or significant stenosis.              Procedures       Emergency Department Course & Assessments:             Interventions:  Medications - No data to display     Assessments, Independent Interpretation, Consult/Discussion of ManagementTests:  ED Course as of 05/10/24 1540   Fri May 10, 2024   1527 I obtained history and examined the patient as noted above.         Social Determinants of Health affecting care:  None    Disposition:  The patient was discharged to home.     Impression & Plan    CMS Diagnoses: None    Code Status: Prior    Medical Decision MakinyF with hx of HTN, chronic carotid artery stenosis, subacute headache who was referred to the emergency department for persistent headache with evaluation for cerebral sinus venous thrombosis.  Patient overall stable, no neurologic deficit.  She was also seen in the emergency department by Dr. Barclay of stroke neurology who performed evaluation as well.  MR studies here without acute findings, had recent MRA completed.  Patient's headache had improved on its own.  Given facial swelling also sent thyroid and urine states to have any proteinuria these were both unremarkable.   Considered carbon oxide however feel this is less likely with only her having headaches and this facial swelling that improves throughout the day as well.  Doubt meningitis/encephalitis. Findings were discussed with family members as well as Dr Barclay over phone.  He will connect her with headache clinic.  Patient was discharged stable condition with return precautions given.     Diagnosis:    ICD-10-CM    1. Headache, unspecified headache type   R51.9            Discharge Medications:  New Prescriptions    No medications on file      Scribe Disclosure:  I, Morales Belén, am serving as a scribe at 3:40 PM on 5/10/2024 to document services personally performed by Neeta Winter MD based on my observations and the provider's statements to me.    5/10/2024   Neeta Winter MD Wu Klasek, Connie, MD  05/11/24 1406

## 2024-05-10 NOTE — CONSULTS
Neurology Consult Note  Date of service: 5/10/2024     Chief Complaint: Headache, burred vision, facial swelling.      History of Present Illness:  This is a 63 year old right handed Malaysian lady with history of hypertension, hyperlipidemia, depression/anxiety, and s/p hysterectomy whom I am seeing in consult for headache, burred vision, and facial swelling. The symptoms started about a month ago. She complains of headache that is moderate-severe affecting the bifrontal/retroorbital areas and radiate to the occipital area, associated with blurring of vision of both eyes and occasionally nausea/vomiting. No orthostatic component. No neck pain or stiffness. No pain clicking/popping in the TMJ or jaw claudication. Patient also complains of facial swelling upon awakening from sleep that improves over the course of the day. The patient and her family initially attributed the symptoms to cataract surgery which was done a few weeks prior to the onset of symptoms. However, looking at her chart, post-cataract examination by ophthalmologist a week after the procedure was unremarkable and patient was subsequently seen by optometry when she had the headache and that examination was also unremarkable, according to the patient and her daughter who is with her in the ED today (not in the chart). Patient was seen by PCP who suspected allergies causing facial swelling and put her on antiallergy medication but symptoms kept getting worse. She was also seen by cardiology because of elevated blood pressure and antihypertensive agents were adjusted, but still she continues to have headaches and has noticed worsening BP when pain is severe. No eye pain with eye movement. No double vision. No facial sensory loss or droop. No change in voice or slurred speech. No change in hearing, taste, or smell. No focal weakness or numbness. No vertigo or unsteady gait. Patient was seen in ED 3 weeks ago for headache and had MRI, MRA, MRV, all of  "which were unremarkable except for moderate R ICA origin stenosis.   Patient is back in the ED today with worsening symptoms.        Home meds:    acetaminophen prn   carvedilol (COREG) 25 MG tablet bid    gabapentin (NEURONTIN) 300 MG bid    hydroCHLOROthiazide (ORETIC) 25 MG qd   lisinopril (ZESTRIL) 10 MG tablet qd   sertraline (ZOLOFT) 50 MG qd   traZODone (DESYREL) 100 MG qhs     Past Medical History:   Diagnosis Date    Cardiac angina (H)     Hypertension             Social History     Tobacco Use    Smoking status: Never    Smokeless tobacco: Never   Substance Use Topics    Alcohol use: No    Drug use: No       No family history on file.     No Known Allergies       Review of Systems:  10 point ROS performed. Pertinent positives and negatives in HPI. Otherwise unremarkable.       Physical Examination:  Vital Signs  BP Readings from Last 1 Encounters:   05/10/24 (!) 182/82     Pulse Readings from Last 1 Encounters:   05/10/24 83     Wt Readings from Last 1 Encounters:   05/10/24 97.5 kg (215 lb)     Ht Readings from Last 1 Encounters:   05/10/24 1.676 m (5' 6\")     Estimated body mass index is 34.7 kg/m  as calculated from the following:    Height as of this encounter: 1.676 m (5' 6\").    Weight as of this encounter: 97.5 kg (215 lb).    Temp Readings from Last 1 Encounters:   05/10/24 98.2  F (36.8  C) (Temporal)          General Exam:  GEN:  Neatly dressed, well groomed, obese   HEAD:  Atraumatic/Normocephalic, no tenderness or clicking of the TMJs, Tenderness to palpation of the bilateral frontal areas.   EYES:  Non-icterus, no conjunctivitis, no proptosis  EARS:  No otic discharge  NOSE:  Patent nares, no discharge  THROAT:  MMM, No oral lesions  CV:  RRR  PULM:  Breathing comfortably on room air, no tachypnea, not using accessory muscles of respiration  MSK:  bilat pedal edema   SKIN:  No dermatitis, no apparent rash  VASCULAR:  STA normal on palpation without beading or tenderness.      NEUROLOGICAL " EXAM:  Mental State:   Awake, Oriented to time, place, and persons. Attentive. Dysphoric affect.   LANGUAGE/SPEECH:    No dysphasia or paraphasic errors.    CN:   I:  Deferred  II:  FAROOQ, VFF  III/IV/VI: EOMI, no nystagmus  V:  V1-V3 intact  VII:  Face was symmetric bilaterally  VIII:  Hearing was intact to conversational voice  IX/X:  Palatal raise was intact bilaterally  XI:  Shoulder shrug was strong bilaterally; Pt is able to rotate head left and right against resistance  XII:  Tongue midline; able to move it left and right without any difficulties  MOTOR:  State and Bulk:  No atrophy, hypertrophy, or fasciculations.   Tone:  Normal in bilateral UE and LE  Dexterity and speed: Intact  Power: no focal weakness, 5/5 in all muscle groups  Sensory:   No hemihypesthesia  Coordination:  Intact finger to nose and heel to shin bilaterally; no dysmetria or decomposition of movement  Involuntary Movement:    None observed  Gait:    Normal gait, normal arm swing, normal tandem, negative Romberg  Meningeal signs:   Supple neck          Data:     Labs:   None to review     Neuroimaging:      I personally reviewed the MRI, MRA, MRV done on 4/25/24 and it showed findings as noted above.         ASSESSMENT:  Progressive headache associated with n/v, and facial swelling. My main concern is venous sinus thrombosis given the reported facial swelling that is worst upon awakening in the morning. Cavernous sinus thrombosis is hard to detect on imaging but it should cause proptosis and chemosis. The patient had had cataract surgery a few weeks prior to symptoms onset which raise the question about ocular complications, especially with the reported blurred vision, however, 2 eye exams post surgery were unremarkable. Markedly elevated BP can cause headache but patient reports that she has had hypertension for years and at home, her BP increases after her headache gets worse. Another possibility is GCA but the examination of the STA is  "negative. There is some tenderness of the frontalis which may associate tension type headache.      PLAN:  -MRI brain with and without contrast.   -MRV head with and without contrast.   -ESR and CRP.   -If workup is negative, patient can discharge from the ED and follow up with outpatient general neurology.   -Also, if workup is negative, I suggest switching her from lisinopril 10/d to verapamil extended release 180 mg/d given the favorable effect of the latter on headache.   -Patient should monitor her BP at home and continue to follow with her PCP and cardiology to optimize control.     I spent 50 minutes in face to face with this patient and coordinating care. Over 50% of the time on the unit was spent counseling the patient and/or coordinating care as documented.     Tye Barclay MD, Msc, FAELIA, FAAN   of Neurology  [facility]     05/10/2024 3:35 PM  To page me or covering stroke neurology team member, click here: [facility]  Choose \"On Call\" tab at top, then search dropdown box for \"Neurology Adult\" & press Enter, look for Neuro ICU/Stroke  "

## 2024-05-20 DIAGNOSIS — R51.9 ACUTE INTRACTABLE HEADACHE, UNSPECIFIED HEADACHE TYPE: Primary | ICD-10-CM

## 2024-05-23 ENCOUNTER — OFFICE VISIT (OUTPATIENT)
Dept: NEUROLOGY | Facility: CLINIC | Age: 64
End: 2024-05-23
Payer: COMMERCIAL

## 2024-05-23 VITALS
HEIGHT: 66 IN | DIASTOLIC BLOOD PRESSURE: 74 MMHG | HEART RATE: 62 BPM | WEIGHT: 215 LBS | OXYGEN SATURATION: 99 % | SYSTOLIC BLOOD PRESSURE: 118 MMHG | BODY MASS INDEX: 34.55 KG/M2

## 2024-05-23 DIAGNOSIS — R51.9 ACUTE INTRACTABLE HEADACHE, UNSPECIFIED HEADACHE TYPE: ICD-10-CM

## 2024-05-23 PROCEDURE — 99417 PROLNG OP E/M EACH 15 MIN: CPT | Performed by: PSYCHIATRY & NEUROLOGY

## 2024-05-23 PROCEDURE — 99215 OFFICE O/P EST HI 40 MIN: CPT | Performed by: PSYCHIATRY & NEUROLOGY

## 2024-05-23 RX ORDER — TRAZODONE HYDROCHLORIDE 100 MG/1
100 TABLET ORAL
COMMUNITY
Start: 2023-05-04

## 2024-05-23 RX ORDER — LISINOPRIL 20 MG/1
20 TABLET ORAL DAILY
COMMUNITY
Start: 2024-05-01

## 2024-05-23 RX ORDER — FREMANEZUMAB-VFRM 225 MG/1.5ML
225 INJECTION SUBCUTANEOUS
Qty: 1.5 ML | Refills: 3 | Status: SHIPPED | OUTPATIENT
Start: 2024-05-23

## 2024-05-23 RX ORDER — FLUTICASONE PROPIONATE 50 MCG
2 SPRAY, SUSPENSION (ML) NASAL
COMMUNITY
Start: 2024-05-01

## 2024-05-23 RX ORDER — AMLODIPINE BESYLATE 2.5 MG/1
2.5 TABLET ORAL
COMMUNITY
Start: 2024-05-02 | End: 2025-05-02

## 2024-05-23 RX ORDER — GABAPENTIN 300 MG/1
300 CAPSULE ORAL
COMMUNITY
Start: 2023-05-04

## 2024-05-23 ASSESSMENT — HEADACHE IMPACT TEST (HIT 6)
HOW OFTEN DID HEADACHS LIMIT CONCENTRATION ON WORK OR DAILY ACTIVITY: ALWAYS
WHEN YOU HAVE A HEADACHE HOW OFTEN DO YOU WISH YOU COULD LIE DOWN: SOMETIMES
HOW OFTEN HAVE YOU FELT TOO TIRED TO WORK BECAUSE OF YOUR HEADACHES: ALWAYS
WHEN YOU HAVE HEADACHES HOW OFTEN IS THE PAIN SEVERE: SOMETIMES
HOW OFTEN DO HEADACHES LIMIT YOUR DAILY ACTIVITIES: VERY OFTEN
HIT6 TOTAL SCORE: 68
HOW OFTEN HAVE YOU FELT FED UP OR IRRITATED BECAUSE OF YOUR HEADACHES: VERY OFTEN

## 2024-05-23 ASSESSMENT — PATIENT HEALTH QUESTIONNAIRE - PHQ9: SUM OF ALL RESPONSES TO PHQ QUESTIONS 1-9: 3

## 2024-05-23 ASSESSMENT — MIGRAINE DISABILITY ASSESSMENT (MIDAS)
HOW MANY DAYS WAS YOUR PRODUCTIVITY CUT IN HALF BECAUSE OF HEADACHES: 30
HOW MANY DAYS WAS HOUSEWORK PRODUCTIVITY CUT IN HALF DUE TO HEADACHES: 30
TOTAL SCORE: 120
HOW OFTEN WERE SOCIAL ACTIVITIES MISSED DUE TO HEADACHES: 30
HOW MANY DAYS DID YOU NOT DO HOUSEWORK BECAUSE OF HEADACHES: 30
HOW MANY DAYS DID YOU MISS WORK OR SCHOOL BECAUSE OF HEADACHES: 0

## 2024-05-23 NOTE — PROGRESS NOTES
[facility]   Headache Neurology Consult  May 23, 2024     Hallie Huitron MRN# [id number]   YOB: 1960 Age: 63 year old     Requesting provider:     Tye Barclay MD            Assessment and Recommendations:     Hallie Huitron is a 63 year old female who presents for evaluation of headaches consistent with chronic migraine. She has recently been started on treatment for hypertension after this was exacerbating headaches, but they have persisted on a daily basis despite their improvement in severity.     I reviewed her MR brain venogram and MR brain with and without contrast as well as MR brain and neck angiogram and agree with the reports. Has partially empty sella, without clinical correlates for IIH, and approximately 50% moderate to long stem stenosis of the proximal and mid right cervical internal artery. She has an essentially normal ESR/CRP.       Headache treatment plan:  Acute medication recommendations:  Tylenol at this time    Preventative medication recommendations:  Ajovy 225mg every 30 days    Discussed side effects with her today.    Will meet back 3 months after she starts Ajovy.     Total time spent today was 73 min in chart review, history, exam and counseling.      Ladonna Joe MD  Neurology            Chief Complaint:     Chief Complaint   Patient presents with    Headache     Referred. Dr. Barclay   Duration: 30/30, Medication: tylenol Symptoms: Front and bilateral sides then moves to back. Denies feeling sick to stomach, blurry vision, light sensitivity sometimes (based on severity).     Headache, blurring of vision, facial swelling. Discussed wtih Dr Adames.        History is obtained from the patient and medical record.      Hallie Huitron is a 63 year old female who had headaches but they were not memorable.   Does not recall onset. The headaches of presentation began 6 weeks ago.    At that time, she awoke with a headaches and facial swelling.  She has left and right eye cataract surgeries on March 20th (after the second one, left eye). After 2 weeks, the headaches began. She was becoming completely dysfunctional. She has trialed different medication cocktails and trialed blood pressure control. Blood pressure is good now. She is now having a decreased intensity of headaches, but it is still constant.     Pain is holocephalic and pressure like.  After that, she has periorbital swelling bilaterally. She did have blurry vision after surgery and it would take time, but would improved. She has noticed improvement with near vision since the surgery. The blurriness is also improved by incompletely so.     Before the pain would be a 9-10/10. Now, pain is a 5-6/10. Pain is daily since onset, previously persistent and now at times, there will be remission daily, but continued daily headaches.  She has had blood pressure controlled for the past 2 weeks, and in that time, and no more severe headache days.     Activity would cause dizziness. There was photophobia. No phonophobia. Sometimes there was nausea, but no longer with blood pressure controlled. No more dizziness. She feels neck pain with the headache. It may induce the headache. Neck motion is restricted, she does feel relaxation with stiffness.   Pain is still prominent in the morning and it will not have temporal worsening.   It will not remit with her lying down, it seems to be the same.    She has had tinnitus. There was a steady whistling sound, and that improved with hypertensive control. She was 190/84 on 4/25/2024. At that time she had left sided chest pain with radiation to the back.             Past Medical History:     Past Medical History:   Diagnosis Date    Cardiac angina (H)     Hypertension    Depression  Cataract, nuclear sclerotic senile, bilateral   Stenosis of right carotid artery          Past Surgical History:     Past Surgical History:   Procedure Laterality Date    CHOLECYSTECTOMY       HYSTERECTOMY      ORTHOPEDIC SURGERY      knee replacement Right             Social History:     Social History     Socioeconomic History    Marital status:      Spouse name: Not on file    Number of children: Not on file    Years of education: Not on file    Highest education level: Not on file   Occupational History    Not on file   Tobacco Use    Smoking status: None currently, historical occasional    Smokeless tobacco: Never   Substance and Sexual Activity    Alcohol use: No    Drug use: No    Sexual activity: Not on file   Other Topics Concern    Not on file   Social History Narrative    Not on file     Social Determinants of Health     Financial Resource Strain: Not At Risk (5/4/2023)    Received from [employer]    Financial Resource Strain     Is it hard for you to pay for the very basics like food, housing, medical care or heating?: No   Food Insecurity: Not At Risk (5/4/2023)    Received from [employer]    Food Insecurity     Does your food run out before you have the money to buy more?: No   Transportation Needs: Not At Risk (5/4/2023)    Received from [employer]    Transportation Needs     Does a lack of transportation keep you from your medical appointments or from getting your medications?: No   Physical Activity: Not on file   Stress: Not on file   Social Connections: Not on file   Interpersonal Safety: Unknown (3/6/2024)    Received from [employer]    Humiliation, Afraid, Rape, and Kick questionnaire     Fear of Current or Ex-Partner: Not on file     Emotionally Abused: Not on file     Physically Abused: No     Sexually Abused: No   Housing Stability: Not on file             Family History:   Mother and daughter and son with migraine          Allergies:    No Known Allergies          Medications:   Acute headache medications:    acetaminophen (TYLENOL) 650 MG CR tablet, Take 650 mg by mouth every 8 hours as needed for mild pain or fever, Disp: , Rfl:  - takes 2-3 days per  "week  Ibuprofen or aleve - not currently for headaches    Anti-nausea- Reglan      Preventative headache medications:  None    Current Outpatient Medications:     amLODIPine (NORVASC) 2.5 MG tablet, Take 2.5 mg by mouth, Disp: , Rfl:     ATORVASTATIN CALCIUM PO, Take 20 mg by mouth daily, Disp: , Rfl:     carvedilol (COREG) 25 MG tablet, Take 1 tablet (25 mg) by mouth 2 times daily (with meals), Disp: 60 tablet, Rfl: 0, partial effect for headache    fluticasone (FLONASE) 50 MCG/ACT nasal spray, 2 sprays, Disp: , Rfl:     gabapentin (NEURONTIN) 300 MG capsule, Take 300 mg  twice daily by mouth, Disp: , Rfl: - 3 years    lisinopril (ZESTRIL) 20 MG tablet, Take 20 mg by mouth daily, Disp: , Rfl: (increased dose from 10mg to 20mg)    sertraline (ZOLOFT) 50 MG tablet, Take 50 mg by mouth daily, Disp: , Rfl: - 6 years    traZODone (DESYREL) 100 MG tablet, Take 100 mg by mouth, Disp: , Rfl:           Physical Exam:   /74   Pulse 62   Ht 1.676 m (5' 6\")   Wt 97.5 kg (215 lb)   SpO2 99%   BMI 34.70 kg/m     Physical Exam:   Neurologic:   Mental Status Exam: Alert, awake and oriented to situation. No dysarthria. Speech of normal fluency.   Cranial Nerves: Fundoscopic exam with clear disc margins bilaterally. PERRLA, EOMs intact, no nystagmus, facial movements symmetric, facial sensation intact to light touch, hearing intact to conversation, trapezius and SCMs 5/5 bilaterally, tongue midline and fully mobile. No tongue atrophy.    Motor: Normal tone in all four extremities, no atrophy. 5/5 strength bilaterally in shoulder abduction, elbow extensors and flexors, wrist extensors and flexors, hip flexors, knee extensors and flexors, dorsi- and plantarflexion. No tremors or abnormal movements noted.   Sensory: Sensation intact to pinprick and vibration sensation on arms and legs bilaterally.    Coordination: Finger-nose-finger intact bilaterally.  Rapidly alternating movements intact bilaterally in the upper " extremities.  Normal finger tapping bilaterally.  Intact heel-shin bilaterally. Normal Romberg.   Reflexes: 2+ and symmetric in triceps, biceps, brachioradialis, patellar, Achilles, and plantars downgoing bilaterally.   Gait: Normal gait.  Able to toe and heel walk.  Tandem gait normal.  Head: Normocephalic, atraumatic.   Eyes: No conjunctival injection, no scleral icterus.           Data:   EXAM: MR BRAIN W/O and W CONTRAST, MRV BRAIN  W/O and W CONTRAST  LOCATION: [facility]  DATE: 5/10/2024     INDICATION: Patient sent in by neurology with concern for sinus venous thrombosis; headache x1 mo and facial swelling  COMPARISON: 4/25/2024  CONTRAST: 10 mL Gadavist  TECHNIQUE:   1) Routine multiplanar multisequence head MRI without and with intravenous contrast.  2) Phase contrast and 2-D time-of-flight head MRV performed after administration of intravenous contrast.     FINDINGS:  HEAD MRI:  INTRACRANIAL CONTENTS: No acute or subacute infarct. No mass, acute hemorrhage, or extra-axial fluid collections. Normal brain parenchymal signal. Mild generalized cerebral atrophy. No hydrocephalus. Normal position of the cerebellar tonsils. No   pathologic contrast enhancement.     SELLA: Partially empty sella.     OSSEOUS STRUCTURES/SOFT TISSUES: Normal marrow signal. The major intracranial vascular flow voids are maintained.      ORBITS: No abnormality accounting for technique.      SINUSES/MASTOIDS: No paranasal sinus mucosal disease. No middle ear or mastoid effusion.      HEAD MRV:   DURAL SINUSES: No significant stenosis or occlusion. Dominant left and smaller right transverse and sigmoid dural sinuses.     INTERNAL CEREBRAL VEINS: No significant stenosis or occlusion.       MAJOR CORTICAL VENOUS BRANCHES: No significant stenosis or occlusion.                                                                      IMPRESSION:  HEAD MRI:   1.  No acute infarct, mass, mass effect, or hemorrhage.  2.   Mild atrophy.     HEAD MRV:   1.  No dural venous sinus thrombosis or significant stenosis.

## 2024-05-23 NOTE — NURSING NOTE
"Hallie Huitron is a 63 year old female who presents for:  Chief Complaint   Patient presents with    Headache     Referred. Dr. Barclay         Initial Vitals:  Pulse 62   Ht 1.676 m (5' 6\")   Wt 97.5 kg (215 lb)   SpO2 99%   BMI 34.70 kg/m   Estimated body mass index is 34.7 kg/m  as calculated from the following:    Height as of this encounter: 1.676 m (5' 6\").    Weight as of this encounter: 97.5 kg (215 lb).. Body surface area is 2.13 meters squared. BP completed using cuff size: sabi Lemus CMA    "

## 2024-05-23 NOTE — LETTER
5/23/2024         RE: Hallie Huitron  [address] [facility] [address]        Dear Colleague,    Thank you for referring your patient, Hallie Huitron, to the [facility] NEUROLOG[facility]. Please see a copy of my visit note below.    [facility]   Headache Neurology Consult  May 23, 2024     Hallie Huitron MRN# [id number]   YOB: 1960 Age: 63 year old     Requesting provider:     Tye Barclay MD            Assessment and Recommendations:     Hallie Huitron is a 63 year old female who presents for evaluation of headaches consistent with chronic migraine. She has recently been started on treatment for hypertension after this was exacerbating headaches, but they have persisted on a daily basis despite their improvement in severity.     I reviewed her MR brain venogram and MR brain with and without contrast as well as MR brain and neck angiogram and agree with the reports. Has partially empty sella, without clinical correlates for IIH, and approximately 50% moderate to long stem stenosis of the proximal and mid right cervical internal artery. She has an essentially normal ESR/CRP.       Headache treatment plan:  Acute medication recommendations:  Tylenol at this time    Preventative medication recommendations:  Ajovy 225mg every 30 days    Discussed side effects with her today.    Will meet back 3 months after she starts Ajovy.     Total time spent today was 73 min in chart review, history, exam and counseling.      Ladonna Joe MD  Neurology            Chief Complaint:     Chief Complaint   Patient presents with     Headache     Referred. Dr. Barclay   Duration: 30/30, Medication: tylenol Symptoms: Front and bilateral sides then moves to back. Denies feeling sick to stomach, blurry vision, light sensitivity sometimes (based on severity).     Headache, blurring of vision, facial swelling. Discussed wt Dr Adames.        History is obtained from  the patient and medical record.      Hallie Huitron is a 63 year old female who had headaches but they were not memorable.   Does not recall onset. The headaches of presentation began 6 weeks ago.    At that time, she awoke with a headaches and facial swelling. She has left and right eye cataract surgeries on March 20th (after the second one, left eye). After 2 weeks, the headaches began. She was becoming completely dysfunctional. She has trialed different medication cocktails and trialed blood pressure control. Blood pressure is good now. She is now having a decreased intensity of headaches, but it is still constant.     Pain is holocephalic and pressure like.  After that, she has periorbital swelling bilaterally. She did have blurry vision after surgery and it would take time, but would improved. She has noticed improvement with near vision since the surgery. The blurriness is also improved by incompletely so.     Before the pain would be a 9-10/10. Now, pain is a 5-6/10. Pain is daily since onset, previously persistent and now at times, there will be remission daily, but continued daily headaches.  She has had blood pressure controlled for the past 2 weeks, and in that time, and no more severe headache days.     Activity would cause dizziness. There was photophobia. No phonophobia. Sometimes there was nausea, but no longer with blood pressure controlled. No more dizziness. She feels neck pain with the headache. It may induce the headache. Neck motion is restricted, she does feel relaxation with stiffness.   Pain is still prominent in the morning and it will not have temporal worsening.   It will not remit with her lying down, it seems to be the same.    She has had tinnitus. There was a steady whistling sound, and that improved with hypertensive control. She was 190/84 on 4/25/2024. At that time she had left sided chest pain with radiation to the back.             Past Medical History:     Past Medical History:    Diagnosis Date     Cardiac angina (H)      Hypertension    Depression  Cataract, nuclear sclerotic senile, bilateral   Stenosis of right carotid artery          Past Surgical History:     Past Surgical History:   Procedure Laterality Date     CHOLECYSTECTOMY       HYSTERECTOMY       ORTHOPEDIC SURGERY      knee replacement Right             Social History:     Social History     Socioeconomic History     Marital status:      Spouse name: Not on file     Number of children: Not on file     Years of education: Not on file     Highest education level: Not on file   Occupational History     Not on file   Tobacco Use     Smoking status: None currently, historical occasional     Smokeless tobacco: Never   Substance and Sexual Activity     Alcohol use: No     Drug use: No     Sexual activity: Not on file   Other Topics Concern     Not on file   Social History Narrative     Not on file     Social Determinants of Health     Financial Resource Strain: Not At Risk (5/4/2023)    Received from [employer]    Financial Resource Strain      Is it hard for you to pay for the very basics like food, housing, medical care or heating?: No   Food Insecurity: Not At Risk (5/4/2023)    Received from [employer]    Food Insecurity      Does your food run out before you have the money to buy more?: No   Transportation Needs: Not At Risk (5/4/2023)    Received from [employer]    Transportation Needs      Does a lack of transportation keep you from your medical appointments or from getting your medications?: No   Physical Activity: Not on file   Stress: Not on file   Social Connections: Not on file   Interpersonal Safety: Unknown (3/6/2024)    Received from [employer]    Humiliation, Afraid, Rape, and Kick questionnaire      Fear of Current or Ex-Partner: Not on file      Emotionally Abused: Not on file      Physically Abused: No      Sexually Abused: No   Housing Stability: Not on file             Family History:  "  Mother and daughter and son with migraine          Allergies:    No Known Allergies          Medications:   Acute headache medications:     acetaminophen (TYLENOL) 650 MG CR tablet, Take 650 mg by mouth every 8 hours as needed for mild pain or fever, Disp: , Rfl:  - takes 2-3 days per week  Ibuprofen or aleve - not currently for headaches    Anti-nausea- Reglan      Preventative headache medications:  None    Current Outpatient Medications:      amLODIPine (NORVASC) 2.5 MG tablet, Take 2.5 mg by mouth, Disp: , Rfl:      ATORVASTATIN CALCIUM PO, Take 20 mg by mouth daily, Disp: , Rfl:      carvedilol (COREG) 25 MG tablet, Take 1 tablet (25 mg) by mouth 2 times daily (with meals), Disp: 60 tablet, Rfl: 0, partial effect for headache     fluticasone (FLONASE) 50 MCG/ACT nasal spray, 2 sprays, Disp: , Rfl:      gabapentin (NEURONTIN) 300 MG capsule, Take 300 mg  twice daily by mouth, Disp: , Rfl: - 3 years     lisinopril (ZESTRIL) 20 MG tablet, Take 20 mg by mouth daily, Disp: , Rfl: (increased dose from 10mg to 20mg)     sertraline (ZOLOFT) 50 MG tablet, Take 50 mg by mouth daily, Disp: , Rfl: - 6 years     traZODone (DESYREL) 100 MG tablet, Take 100 mg by mouth, Disp: , Rfl:           Physical Exam:   /74   Pulse 62   Ht 1.676 m (5' 6\")   Wt 97.5 kg (215 lb)   SpO2 99%   BMI 34.70 kg/m     Physical Exam:   Neurologic:   Mental Status Exam: Alert, awake and oriented to situation. No dysarthria. Speech of normal fluency.   Cranial Nerves: Fundoscopic exam with clear disc margins bilaterally. PERRLA, EOMs intact, no nystagmus, facial movements symmetric, facial sensation intact to light touch, hearing intact to conversation, trapezius and SCMs 5/5 bilaterally, tongue midline and fully mobile. No tongue atrophy.    Motor: Normal tone in all four extremities, no atrophy. 5/5 strength bilaterally in shoulder abduction, elbow extensors and flexors, wrist extensors and flexors, hip flexors, knee extensors and " flexors, dorsi- and plantarflexion. No tremors or abnormal movements noted.   Sensory: Sensation intact to pinprick and vibration sensation on arms and legs bilaterally.    Coordination: Finger-nose-finger intact bilaterally.  Rapidly alternating movements intact bilaterally in the upper extremities.  Normal finger tapping bilaterally.  Intact heel-shin bilaterally. Normal Romberg.   Reflexes: 2+ and symmetric in triceps, biceps, brachioradialis, patellar, Achilles, and plantars downgoing bilaterally.   Gait: Normal gait.  Able to toe and heel walk.  Tandem gait normal.  Head: Normocephalic, atraumatic.   Eyes: No conjunctival injection, no scleral icterus.           Data:   EXAM: MR BRAIN W/O and W CONTRAST, MRV BRAIN  W/O and W CONTRAST  LOCATION: [facility]  DATE: 5/10/2024     INDICATION: Patient sent in by neurology with concern for sinus venous thrombosis; headache x1 mo and facial swelling  COMPARISON: 4/25/2024  CONTRAST: 10 mL Gadavist  TECHNIQUE:   1) Routine multiplanar multisequence head MRI without and with intravenous contrast.  2) Phase contrast and 2-D time-of-flight head MRV performed after administration of intravenous contrast.     FINDINGS:  HEAD MRI:  INTRACRANIAL CONTENTS: No acute or subacute infarct. No mass, acute hemorrhage, or extra-axial fluid collections. Normal brain parenchymal signal. Mild generalized cerebral atrophy. No hydrocephalus. Normal position of the cerebellar tonsils. No   pathologic contrast enhancement.     SELLA: Partially empty sella.     OSSEOUS STRUCTURES/SOFT TISSUES: Normal marrow signal. The major intracranial vascular flow voids are maintained.      ORBITS: No abnormality accounting for technique.      SINUSES/MASTOIDS: No paranasal sinus mucosal disease. No middle ear or mastoid effusion.      HEAD MRV:   DURAL SINUSES: No significant stenosis or occlusion. Dominant left and smaller right transverse and sigmoid dural sinuses.      INTERNAL CEREBRAL VEINS: No significant stenosis or occlusion.       MAJOR CORTICAL VENOUS BRANCHES: No significant stenosis or occlusion.                                                                      IMPRESSION:  HEAD MRI:   1.  No acute infarct, mass, mass effect, or hemorrhage.  2.  Mild atrophy.     HEAD MRV:   1.  No dural venous sinus thrombosis or significant stenosis.      Again, thank you for allowing me to participate in the care of your patient.        Sincerely,        Ladonna Joe MD